# Patient Record
Sex: FEMALE | Race: WHITE | Employment: OTHER
[De-identification: names, ages, dates, MRNs, and addresses within clinical notes are randomized per-mention and may not be internally consistent; named-entity substitution may affect disease eponyms.]

---

## 2017-01-08 LAB — HBA1C MFR BLD: 5.4 %

## 2017-01-11 ENCOUNTER — CARE COORDINATION (OUTPATIENT)
Dept: CARE COORDINATION | Facility: CLINIC | Age: 82
End: 2017-01-11

## 2017-01-19 ENCOUNTER — CARE COORDINATION (OUTPATIENT)
Dept: CARE COORDINATION | Facility: CLINIC | Age: 82
End: 2017-01-19

## 2017-01-25 ENCOUNTER — TELEPHONE (OUTPATIENT)
Dept: FAMILY MEDICINE CLINIC | Facility: CLINIC | Age: 82
End: 2017-01-25

## 2017-01-31 ENCOUNTER — OFFICE VISIT (OUTPATIENT)
Dept: FAMILY MEDICINE CLINIC | Facility: CLINIC | Age: 82
End: 2017-01-31

## 2017-01-31 VITALS
DIASTOLIC BLOOD PRESSURE: 64 MMHG | HEART RATE: 83 BPM | RESPIRATION RATE: 15 BRPM | WEIGHT: 105.9 LBS | SYSTOLIC BLOOD PRESSURE: 136 MMHG | BODY MASS INDEX: 20.34 KG/M2

## 2017-01-31 DIAGNOSIS — D64.9 LOW HEMOGLOBIN: ICD-10-CM

## 2017-01-31 DIAGNOSIS — Z91.81 AT HIGH RISK FOR FALLS: ICD-10-CM
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
## 2017-01-31 DIAGNOSIS — S72.002A CLOSED LEFT HIP FRACTURE, INITIAL ENCOUNTER (HCC): ICD-10-CM

## 2017-01-31 PROCEDURE — 99214 OFFICE O/P EST MOD 30 MIN: CPT | Performed by: NURSE PRACTITIONER

## 2017-01-31 RX ORDER — IBUPROFEN 200 MG
200 TABLET ORAL 2 TIMES DAILY
COMMUNITY
End: 2021-05-10

## 2017-02-05 PROBLEM — S72.002A CLOSED LEFT HIP FRACTURE (HCC): Status: ACTIVE | Noted: 2017-02-05

## 2017-02-05 PROBLEM — Z91.81 AT HIGH RISK FOR FALLS: Status: ACTIVE | Noted: 2017-02-05

## 2017-02-05 ASSESSMENT — ENCOUNTER SYMPTOMS
DIARRHEA: 0
ABDOMINAL PAIN: 0
ABDOMINAL DISTENTION: 0
SHORTNESS OF BREATH: 0
CHEST TIGHTNESS: 0
COLOR CHANGE: 0
NAUSEA: 0

## 2017-02-07 ENCOUNTER — CARE COORDINATION (OUTPATIENT)
Dept: CARE COORDINATION | Facility: CLINIC | Age: 82
End: 2017-02-07

## 2017-03-07 ENCOUNTER — CARE COORDINATION (OUTPATIENT)
Dept: CARE COORDINATION | Facility: CLINIC | Age: 82
End: 2017-03-07

## 2017-04-11 ENCOUNTER — CARE COORDINATION (OUTPATIENT)
Dept: CARE COORDINATION | Age: 82
End: 2017-04-11

## 2017-05-02 ENCOUNTER — CARE COORDINATION (OUTPATIENT)
Dept: CARE COORDINATION | Age: 82
End: 2017-05-02

## 2017-06-13 ENCOUNTER — CARE COORDINATION (OUTPATIENT)
Dept: CARE COORDINATION | Age: 82
End: 2017-06-13

## 2017-06-14 ENCOUNTER — CARE COORDINATION (OUTPATIENT)
Dept: CARE COORDINATION | Age: 82
End: 2017-06-14

## 2017-06-15 ENCOUNTER — CARE COORDINATION (OUTPATIENT)
Dept: CARE COORDINATION | Age: 82
End: 2017-06-15

## 2017-10-05 ENCOUNTER — OFFICE VISIT (OUTPATIENT)
Dept: FAMILY MEDICINE CLINIC | Age: 82
End: 2017-10-05
Payer: MEDICARE

## 2017-10-05 VITALS
HEIGHT: 61 IN | DIASTOLIC BLOOD PRESSURE: 80 MMHG | SYSTOLIC BLOOD PRESSURE: 122 MMHG | TEMPERATURE: 97 F | WEIGHT: 102 LBS | BODY MASS INDEX: 19.26 KG/M2 | HEART RATE: 80 BPM | RESPIRATION RATE: 20 BRPM

## 2017-10-05 DIAGNOSIS — Z23 NEED FOR INFLUENZA VACCINATION: ICD-10-CM

## 2017-10-05 DIAGNOSIS — Z00.00 ROUTINE GENERAL MEDICAL EXAMINATION AT A HEALTH CARE FACILITY: Primary | ICD-10-CM

## 2017-10-05 DIAGNOSIS — H35.30 MACULAR DEGENERATION: ICD-10-CM

## 2017-10-05 PROCEDURE — G0008 ADMIN INFLUENZA VIRUS VAC: HCPCS | Performed by: NURSE PRACTITIONER

## 2017-10-05 PROCEDURE — G0438 PPPS, INITIAL VISIT: HCPCS | Performed by: NURSE PRACTITIONER

## 2017-10-05 PROCEDURE — 90688 IIV4 VACCINE SPLT 0.5 ML IM: CPT | Performed by: NURSE PRACTITIONER

## 2017-10-05 ASSESSMENT — PATIENT HEALTH QUESTIONNAIRE - PHQ9: SUM OF ALL RESPONSES TO PHQ QUESTIONS 1-9: 0

## 2017-10-05 ASSESSMENT — ANXIETY QUESTIONNAIRES: GAD7 TOTAL SCORE: 0

## 2017-10-05 NOTE — PROGRESS NOTES
Medicare Annual Wellness Visit  Name: Juan Herrera Date: 10/5/2017   MRN: [id number] Sex: Female   Age: 80 y.o. Ethnicity: Non-/Non    : 1929 Race: Eloy Ding is here for [facility]    Screenings for behavioral, psychosocial and functional/safety risks, and cognitive dysfunction are all negative except as indicated below. These results, as well as other patient data from the [address] form, are documented in Flowsheets linked to this Encounter. Allergies   Allergen Reactions    Seasonal Other (See Comments)     Prior to Visit Medications    Medication Sig Taking? Authorizing Provider   Multiple Vitamins-Minerals (PRESERVISION AREDS 2 PO) Take 1 capsule by mouth Yes Historical Provider, MD   Ascorbic Acid (VITAMIN C PO)  Yes Historical Provider, MD   ibuprofen (ADVIL;MOTRIN) 200 MG tablet Take 200 mg by mouth 2 times daily Yes Historical Provider, MD   BIOTIN PO Take 1 tablet by mouth Yes Historical Provider, MD   Multiple Vitamins-Minerals (THERAPEUTIC MULTIVITAMIN-MINERALS) tablet Take 1 tablet by mouth daily Yes Historical Provider, MD   CALCIUM PO Take 1 tablet by mouth daily Yes Historical Provider, MD   Multiple Vitamins-Minerals (HAIR/SKIN/NAILS PO) Take 1 tablet by mouth daily Yes Historical Provider, MD   Omega-3 Fatty Acids (FISH OIL PO) Take 1 tablet by mouth daily Yes Historical Provider, MD   aspirin 81 MG tablet Take 81 mg by mouth daily  Historical Provider, MD   cephALEXin (KEFLEX) 500 MG capsule Take 500 mg by mouth Indications: to take before oral surgery   Historical Provider, MD     No past medical history on file.   Past Surgical History:   Procedure Laterality Date    TOTAL HIP ARTHROPLASTY Left      Family History   Problem Relation Age of Onset    Diabetes Other        CareTeam (Including outside providers/suppliers regularly involved in providing care):   Patient Care Team:  Shell Lo CNP as PCP - General (Nurse Practitioner)  Lanell Halsted, CNP as PCP - [facility] Attributed Provider    Wt Readings from Last 3 Encounters:   10/05/17 102 lb (46.3 kg)   01/31/17 105 lb 14.4 oz (48 kg)   09/15/16 101 lb 8 oz (46 kg)     Vitals:    10/05/17 1325   BP: 122/80   Site: Left Arm   Pulse: 80   Resp: 20   Temp: 97 °F (36.1 °C)   TempSrc: Tympanic   Weight: 102 lb (46.3 kg)   Height: 5' 0.5\" (1.537 m)       General Appearance: alert and oriented to person, place and time, well developed and well- nourished, in no acute distress  Skin: warm and dry, no rash or erythema  Head: normocephalic and atraumatic  Eyes: pupils equal, round, and reactive to light, extraocular eye movements intact, conjunctivae normal  ENT: tympanic membrane, external ear and ear canal normal bilaterally, nose without deformity, nasal mucosa and turbinates normal without polyps  Neck: supple and non-tender without mass, no thyromegaly or thyroid nodules, no cervical lymphadenopathy  Pulmonary/Chest: clear to auscultation bilaterally- no wheezes, rales or rhonchi, normal air movement, no respiratory distress  Cardiovascular: normal rate, regular rhythm, normal S1 and S2, no murmurs, rubs, clicks, or gallops, distal pulses intact, no carotid bruits  Abdomen: soft, non-tender, non-distended, normal bowel sounds, no masses or organomegaly  Extremities: no cyanosis, clubbing or edema  Musculoskeletal: normal range of motion, no joint swelling, deformity or tenderness  Neurologic: reflexes normal and symmetric, no cranial nerve deficit, gait, coordination and speech normal    The following problems were reviewed today and where indicated follow up appointments were made and/or referrals ordered.     Risk Factor Screenings with Interventions:   Fall Risk:  2 or more falls in past year?: no  Fall with injury in past year?: (!) yes  Fall Risk Interventions:    · Home safety tips provided    Depression:  PHQ-2 Score: 0  Depression Interventions:  · None indicated    Anxiety:  Anxiety Score: 0  Anxiety Interventions:  · None indicated    Cognitive: Words recalled: 3  Clock Drawing Test (CDT) Score: Normal  Cognitive Impairment Interventions:  · None indicated    Substance Abuse:  Social History     Social History Main Topics    Smoking status: Former Smoker     Types: Cigarettes    Smokeless tobacco: Not on file    Alcohol use 2.4 oz/week     4 Standard drinks or equivalent per week    Drug use: Not on file    Sexual activity: Not on file     Audit Questionnaire: Screen for Alcohol Misuse  How often do you have a drink containing alcohol? :  (twice a day )  Substance Abuse Interventions:  · None indicated    Health Risk Assessment:   General  In general, how would you say your health is?: Excellent  In the past 7 days, have you experienced any of the following?: None of These  Do you get the social and emotional support that you need?: Yes  Do you have a Living Will?: Yes  General Health Risk Interventions:  · Ibuprofen for pain a few times a day. Taking with food. Health Habits/Nutrition  Do you exercise for at least 20 minutes 2-3 times per week?: (!) No  Have you lost any weight without trying in the past 3 months?: No  Do you eat fewer than 2 meals per day?: No  Have you seen a dentist within the past year?: Yes  Body mass index is 19.59 kg/(m^2).   Health Habits/Nutrition Interventions:  · None indicated    Hearing/Vision  Do you or your family notice any trouble with your hearing?: (!) Yes  Do you have difficulty driving, watching TV, or doing any of your daily activities because of your eyesight?: (!) Yes  Have you had an eye exam within the past year?: Yes  Hearing/Vision Interventions:  · Hearing concerns:  patient declines any further evaluation/treatment for hearing issues    Safety  Do you have working smoke detectors?: Yes  Have all throw rugs been removed or fastened?: Yes  Do you have non-slip mats in all bathtubs?: (!) No  Do all of your stairways have a railing or banister?: Yes  Are your doorways, halls and stairs free of clutter?: Yes  Do you always fasten your seatbelt when you are in a car?: Yes  Safety Interventions:  · Home safety tips provided    ADLs  In the past 7 days, did you need help from others to perform any of the following everyday activities?: None  In the past 7 days, did you need help from others to take care of any of the following?: None  ADL Interventions:  · None indicated    Personalized Preventive Plan   Current Health Maintenance Status  Immunization History   Administered Date(s) Administered    Influenza Vaccine, unspecified formulation 10/01/2015    Influenza, Quadv, 3 Years and older, IM 09/15/2016    Pneumococcal 13-valent Conjugate ([id number]) 09/15/2016    Pneumococcal Polysaccharide ([id number]) 08/09/2013    Tdap (Boostrix, Adacel) 10/27/2008        Health Maintenance   Topic Date Due    Zostavax vaccine  02/05/1989    Flu vaccine (1) 09/01/2017    DTaP/Tdap/Td vaccine (2 - Td) 10/27/2018    Pneumococcal low/med risk  Completed     Recommendations for Preventive Services Due: see orders.   Recommended screening schedule for the next 5-10 years is provided to the patient in written form: see Patient Instructions/AVS.

## 2017-10-05 NOTE — PATIENT INSTRUCTIONS
Personalized Preventive Plan for Shauna Brownlee - 10/5/2017  Medicare offers a range of preventive health benefits. Some of the tests and screenings are paid in full while other may be subject to a deductible, co-insurance, and/or copay. Some of these benefits include a comprehensive review of your medical history including lifestyle, illnesses that may run in your family, and various assessments and screenings as appropriate. After reviewing your medical record and screening and assessments performed today your provider may have ordered immunizations, labs, imaging, and/or referrals for you. A list of these orders (if applicable) as well as your Preventive Care list are included within your After Visit Summary for your review. Other Preventive Recommendations:    · A preventive eye exam performed by an eye specialist is recommended every 1-2 years to screen for glaucoma; cataracts, macular degeneration, and other eye disorders. · A preventive dental visit is recommended every 6 months. · Try to get at least 150 minutes of exercise per week or 10,000 steps per day on a pedometer . · Order or download the FREE \"Exercise & Physical Activity: Your Everyday Guide\" from [employer]. Call 3-597.519.6869 or search [employer] online. · You need 5743-8813 mg of calcium and 2659-9908 IU of vitamin D per day. It is possible to meet your calcium requirement with diet alone, but a vitamin D supplement is usually necessary to meet this goal.  · When exposed to the sun, use a sunscreen that protects against both UVA and UVB radiation with an SPF of 30 or greater. Reapply every 2 to 3 hours or after sweating, drying off with a towel, or swimming. · Always wear a seat belt when traveling in a car. Always wear a helmet when riding a bicycle or motorcycle.

## 2018-08-30 ENCOUNTER — TELEPHONE (OUTPATIENT)
Dept: FAMILY MEDICINE CLINIC | Age: 83
End: 2018-08-30

## 2018-08-30 DIAGNOSIS — M79.605 BILATERAL LEG PAIN: Primary | ICD-10-CM

## 2018-08-30 DIAGNOSIS — Z87.81 HX OF FRACTURE OF LEFT HIP: ICD-10-CM

## 2018-08-30 DIAGNOSIS — Z91.81 HX OF FALL: ICD-10-CM

## 2018-08-30 DIAGNOSIS — M79.604 BILATERAL LEG PAIN: Primary | ICD-10-CM

## 2018-10-10 ENCOUNTER — OFFICE VISIT (OUTPATIENT)
Dept: FAMILY MEDICINE CLINIC | Age: 83
End: 2018-10-10
Payer: MEDICARE

## 2018-10-10 VITALS
SYSTOLIC BLOOD PRESSURE: 116 MMHG | RESPIRATION RATE: 16 BRPM | HEIGHT: 61 IN | BODY MASS INDEX: 19.07 KG/M2 | HEART RATE: 72 BPM | TEMPERATURE: 97.7 F | DIASTOLIC BLOOD PRESSURE: 80 MMHG | WEIGHT: 101 LBS

## 2018-10-10 DIAGNOSIS — Z00.00 ROUTINE GENERAL MEDICAL EXAMINATION AT A HEALTH CARE FACILITY: Primary | ICD-10-CM

## 2018-10-10 DIAGNOSIS — R79.89 ABNORMAL CBC: ICD-10-CM

## 2018-10-10 PROCEDURE — G0439 PPPS, SUBSEQ VISIT: HCPCS | Performed by: NURSE PRACTITIONER

## 2018-10-10 ASSESSMENT — LIFESTYLE VARIABLES
HAVE YOU OR SOMEONE ELSE BEEN INJURED AS A RESULT OF YOUR DRINKING: 0
AUDIT-C TOTAL SCORE: 6
HOW MANY STANDARD DRINKS CONTAINING ALCOHOL DO YOU HAVE ON A TYPICAL DAY: 0
HOW OFTEN DO YOU HAVE SIX OR MORE DRINKS ON ONE OCCASION: 2
HAS A RELATIVE, FRIEND, DOCTOR, OR ANOTHER HEALTH PROFESSIONAL EXPRESSED CONCERN ABOUT YOUR DRINKING OR SUGGESTED YOU CUT DOWN: 0
AUDIT TOTAL SCORE: 6
HOW OFTEN DURING THE LAST YEAR HAVE YOU HAD A FEELING OF GUILT OR REMORSE AFTER DRINKING: 0
HOW OFTEN DO YOU HAVE A DRINK CONTAINING ALCOHOL: 4
HOW OFTEN DURING THE LAST YEAR HAVE YOU FAILED TO DO WHAT WAS NORMALLY EXPECTED FROM YOU BECAUSE OF DRINKING: 0
HOW OFTEN DURING THE LAST YEAR HAVE YOU FOUND THAT YOU WERE NOT ABLE TO STOP DRINKING ONCE YOU HAD STARTED: 0
HOW OFTEN DURING THE LAST YEAR HAVE YOU BEEN UNABLE TO REMEMBER WHAT HAPPENED THE NIGHT BEFORE BECAUSE YOU HAD BEEN DRINKING: 0
HOW OFTEN DURING THE LAST YEAR HAVE YOU NEEDED AN ALCOHOLIC DRINK FIRST THING IN THE MORNING TO GET YOURSELF GOING AFTER A NIGHT OF HEAVY DRINKING: 0

## 2018-10-10 ASSESSMENT — ANXIETY QUESTIONNAIRES: GAD7 TOTAL SCORE: 0

## 2018-10-10 ASSESSMENT — PATIENT HEALTH QUESTIONNAIRE - PHQ9
SUM OF ALL RESPONSES TO PHQ QUESTIONS 1-9: 0
SUM OF ALL RESPONSES TO PHQ QUESTIONS 1-9: 0

## 2018-10-10 NOTE — PATIENT INSTRUCTIONS
ibuprofen 600 mg every 8 hours as needed for pain. Cmppression stocking? Personalized Preventive Plan for Kathy Perez - 10/10/2018  Medicare offers a range of preventive health benefits. Some of the tests and screenings are paid in full while other may be subject to a deductible, co-insurance, and/or copay. Some of these benefits include a comprehensive review of your medical history including lifestyle, illnesses that may run in your family, and various assessments and screenings as appropriate. After reviewing your medical record and screening and assessments performed today your provider may have ordered immunizations, labs, imaging, and/or referrals for you. A list of these orders (if applicable) as well as your Preventive Care list are included within your After Visit Summary for your review. Other Preventive Recommendations:    · A preventive eye exam performed by an eye specialist is recommended every 1-2 years to screen for glaucoma; cataracts, macular degeneration, and other eye disorders. · A preventive dental visit is recommended every 6 months. · Try to get at least 150 minutes of exercise per week or 10,000 steps per day on a pedometer . · Order or download the FREE \"Exercise & Physical Activity: Your Everyday Guide\" from [employer]. Call 7-383.356.8683 or search [employer] online. · You need 5418-7619 mg of calcium and 0349-5787 IU of vitamin D per day. It is possible to meet your calcium requirement with diet alone, but a vitamin D supplement is usually necessary to meet this goal.  · When exposed to the sun, use a sunscreen that protects against both UVA and UVB radiation with an SPF of 30 or greater. Reapply every 2 to 3 hours or after sweating, drying off with a towel, or swimming. · Always wear a seat belt when traveling in a car. Always wear a helmet when riding a bicycle or motorcycle.   Patient Education        Bladder Training: Care Instructions  Your Care Instructions    Bladder training is used to treat urge incontinence and stress incontinence. Urge incontinence means that the need to urinate comes on so fast that you can't get to a toilet in time. Stress incontinence means that you leak urine because of pressure on your bladder. For example, it may happen when you laugh, cough, or lift something heavy. Bladder training can increase how long you can wait before you have to urinate. It can also help your bladder hold more urine. And it can give you better control over the urge to urinate. It is important to remember that bladder training takes a few weeks to a few months to make a difference. You may not see results right away, but don't give up. Follow-up care is a key part of your treatment and safety. Be sure to make and go to all appointments, and call your doctor if you are having problems. It's also a good idea to know your test results and keep a list of the medicines you take. How can you care for yourself at home? Work with your doctor to come up with a bladder training program that is right for you. You may use one or more of the following methods. Delayed urination  In the beginning, try to keep from urinating for 5 minutes after you first feel the need to go. While you wait, take deep, slow breaths to relax. Kegel exercises can also help you delay the need to go to the bathroom. After some practice, when you can easily wait 5 minutes to urinate, try to wait 10 minutes before you urinate. Slowly increase the waiting period until you are able to control when you have to urinate. Scheduled urination  Empty your bladder when you first wake up in the morning. Schedule times throughout the day when you will urinate. Start by going to the bathroom every hour, even if you don't need to go. Slowly increase the time between trips to the bathroom. When you have found a schedule that works well for you, keep doing it.   If you wake up during the night and have to urinate, do it. Apply your schedule to waking hours only. Kegel exercises  These tighten and strengthen pelvic muscles, which can help you control the flow of urine. To do Kegel exercises:  Squeeze the same muscles you would use to stop your urine. Your belly and thighs should not move. Hold the squeeze for 3 seconds, and then relax for 3 seconds. Start with 3 seconds. Then add 1 second each week until you are able to squeeze for 10 seconds. Repeat the exercise 10 to 15 times a session. Do three or more sessions a day. When should you call for help? Watch closely for changes in your health, and be sure to contact your doctor if:    Your incontinence is getting worse.     You do not get better as expected. Where can you learn more? Go to [email or web address] org and sign in to your [employer] account. Enter U279 in the [employer] box to learn more about \"Bladder Training: Care Instructions. \"     If you do not have an account, please click on the \"Sign Up Now\" link. Current as of: May 12, 2017  Content Version: 11.7  © 9979-1708 [employer]. Care instructions adapted under license by [facility] ([facility]). If you have questions about a medical condition or this instruction, always ask your healthcare professional. [address] any warranty or liability for your use of this information. Patient Education        Kegel Exercises: Care Instructions  Your Care Instructions    Kegel exercises strengthen muscles around the bladder. These muscles control the flow of urine. Kegel exercises are sometime called \"pelvic floor\" exercises. They can help prevent urine leakage and keep the pelvic organs in place. A woman who just had a baby might want to try Kegel exercises. They can strengthen pelvic muscles that have been weakened by pregnancy and childbirth.  A man or woman may use Kegel exercises to treat urine

## 2018-10-10 NOTE — PROGRESS NOTES
Medicare Annual Wellness Visit  Name: Ally Cowart Date: 10/10/2018   MRN: [id number] Sex: Female   Age: 80 y.o. Ethnicity: Non-/Non    : 1929 Race: Dorothea Cody is here for [employer]      [facility] [place] [facility] Had physical therapy fo Low back pain. Doing exercises. Feels much better. Has help from daughter[facility] Will get flu shot in [place]. Has cut done on ibuprofen. On keflex from the dentist.  Concerns of a leaky bladder. History of stress incontinence. Would like some information on [place] exercises. Does not want medication. Not passed her vision test and no longer has a 's license. She has friends that drive her and also uses [employer]. Has a lump on her left hand, above the knuckle on her pointer finger that she would like looked at. Screenings for behavioral, psychosocial and functional/safety risks, and cognitive dysfunction are all negative except as indicated below. These results, as well as other patient data from the [address] form, are documented in Flowsheets linked to this Encounter. Allergies   Allergen Reactions    Seasonal Other (See Comments)       Prior to Visit Medications    Medication Sig Taking?  Authorizing Provider   Multiple Vitamins-Minerals (PRESERVISION AREDS 2 PO) Take 1 capsule by mouth Yes Historical Provider, MD   Ascorbic Acid (VITAMIN C PO)  Yes Historical Provider, MD   ibuprofen (ADVIL;MOTRIN) 200 MG tablet Take 200 mg by mouth 2 times daily Yes Historical Provider, MD   BIOTIN PO Take 1 tablet by mouth Yes Historical Provider, MD   aspirin 81 MG tablet Take 81 mg by mouth daily Yes Historical Provider, MD   cephALEXin (KEFLEX) 500 MG capsule Take 500 mg by mouth Indications: to take before oral surgery  Yes Historical Provider, MD   Multiple Vitamins-Minerals (THERAPEUTIC MULTIVITAMIN-MINERALS) tablet Take 1 tablet by mouth daily Yes Historical Provider, MD   CALCIUM PO Take 1 tablet by mouth daily Yes Historical Provider, MD   Multiple Vitamins-Minerals (HAIR/SKIN/NAILS PO) Take 1 tablet by mouth daily Yes Historical Provider, MD   Omega-3 Fatty Acids (FISH OIL PO) Take 1 tablet by mouth daily Yes Historical Provider, MD     No past medical history on file. Past Surgical History:   Procedure Laterality Date    TOTAL HIP ARTHROPLASTY Left        Family History   Problem Relation Age of Onset    Diabetes Other        CareTeam (Including outside providers/suppliers regularly involved in providing care):   Patient Care Team:  ALEIDA Queen CNP as PCP - General (Nurse Practitioner)  ALEIDA Queen CNP as PCP - [facility] Attributed Provider    Wt Readings from Last 3 Encounters:   10/10/18 101 lb (45.8 kg)   10/05/17 102 lb (46.3 kg)   01/31/17 105 lb 14.4 oz (48 kg)     Vitals:    10/10/18 1141   BP: 116/80   Site: Left Upper Arm   Position: Sitting   Cuff Size: Medium Adult   Pulse: 72   Resp: 16   Temp: 97.7 °F (36.5 °C)   TempSrc: Tympanic   Weight: 101 lb (45.8 kg)   Height: 5' 1\" (1.549 m)     Body mass index is 19.08 kg/m².     General Appearance: alert and oriented to person, place and time, well-developed and well-nourished, in no acute distress  Skin: warm and dry, no rash or erythema  Head: normocephalic and atraumatic  Eyes: pupils equal, round, and reactive to light, extraocular eye movements intact, conjunctivae normal  ENT: tympanic membrane, external ear and ear canal normal bilaterally, oropharynx clear and moist with normal mucous membranes and hearing grossly normal bilaterally  Neck: neck supple and non tender without mass, no thyromegaly or thyroid nodules, no cervical lymphadenopathy and decreased ROM   Pulmonary/Chest: wheezing present- diffuse throughout  Cardiovascular: normal rate, normal S1 and S2, no gallops, intact distal pulses and no carotid bruits  Abdomen: soft, non-tender, non-distended, normal bowel sounds, no masses or organomegaly  Extremities: no cyanosis and no clubbing  Musculoskeletal: normal range of motion, no joint swelling, deformity or tenderness and 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 The following problems were reviewed today and where indicated follow up appointments were made and/or referrals ordered. Positive Risk Factor Screenings with Interventions:     General Health:  General  In general, how would you say your health is?: Very Good  In the past 7 days, have you experienced any of the following?: (!) New or Increased Pain  Do you get the social and emotional support that you need?: Yes  Do you have a Living Will?: Yes  General Health Risk Interventions:  · Pain issues: patient declines any further evaluation/treatment for this issue, patient had physical therapy. doing exercises at home. Health Habits/Nutrition:  Health Habits/Nutrition  Do you exercise for at least 20 minutes 2-3 times per week?: (!) No  Have you lost any weight without trying in the past 3 months?: (!) Yes  Do you eat fewer than 2 meals per day?: No  Have you seen a dentist within the past year?: Yes  Body mass index is 19.08 kg/m². Health Habits/Nutrition Interventions:  · routine back stretches.     Hearing/Vision:  Hearing/Vision  Do you or your family notice any trouble with your hearing?: (!) Yes  Do you have difficulty driving, watching TV, or doing any of your daily activities because of your eyesight?: No  Have you had an eye exam within the past year?: Yes  Hearing/Vision Interventions:  · Hearing concerns:  patient declines any further evaluation/treatment for hearing issues    Safety:  Safety  Do you have working smoke detectors?: Yes  Have all throw rugs been removed or fastened?: Yes  Do you have non-slip mats in all bathtubs?: (!) No  Do all of your stairways have a railing or banister?: Yes  Are your doorways, halls and stairs free of clutter?: Yes  Do you always fasten your seatbelt when you are in a car?: Yes  Safety Interventions:  · Home safety tips provided    ADL:  ADLs  In the past 7 days, did you need help from others to perform any of the following everyday activities?: None  In the past 7 days, did you need help from others to take care of any of the following?: (!) Transportation  ADL Interventions:  · Patient declines any further evaluation/treatment for this issue     Failed driving test. Macular degeneration. Personalized Preventive Plan   Current Health Maintenance Status  Immunization History   Administered Date(s) Administered    Influenza Vaccine, unspecified formulation 10/01/2015    Influenza, Quadv, 3 Years and older, IM (Fluzone 3 yrs and older or Afluria 5 yrs and older) 09/15/2016, 10/05/2017    Pneumococcal 13-valent Conjugate ([id number]) 09/15/2016    Pneumococcal Polysaccharide (Rzuzzicld22) 08/09/2013    Tdap (Boostrix, Adacel) 10/27/2008        Health Maintenance   Topic Date Due    Shingles Vaccine (1 of 2 - 2 Dose Series) 02/05/1979    Flu vaccine (1) 09/01/2018    DTaP/Tdap/Td vaccine (2 - Td) 10/27/2018    Pneumococcal low/med risk  Completed     Recommendations for Preventive Services Due: see orders and patient instructions/AVS.  . Recommended screening schedule for the next 5-10 years is provided to the patient in written form: see Patient Instructions/AVS.    Recommend chest x-ray for continuous wheezing and lungs. She denies shortness of breath. She does not want any further evaluation or medications.

## 2018-10-18 ENCOUNTER — HOSPITAL ENCOUNTER (OUTPATIENT)
Age: 83
Setting detail: SPECIMEN
Discharge: HOME | End: 2018-10-18
Payer: MEDICARE

## 2018-10-18 DIAGNOSIS — R79.89 ABNORMAL CBC: ICD-10-CM

## 2018-10-18 DIAGNOSIS — Z00.00 ROUTINE GENERAL MEDICAL EXAMINATION AT A HEALTH CARE FACILITY: ICD-10-CM

## 2018-10-18 LAB
ALBUMIN SERPL-MCNC: 4.6 G/DL (ref 3.5–5.2)
ALBUMIN/GLOBULIN RATIO: 2.2 (ref 1–2.5)
ALP BLD-CCNC: 51 U/L (ref 35–104)
ALT SERPL-CCNC: 16 U/L (ref 5–33)
ANION GAP SERPL CALCULATED.3IONS-SCNC: 16 MMOL/L (ref 9–17)
AST SERPL-CCNC: 29 U/L
BILIRUB SERPL-MCNC: 0.61 MG/DL (ref 0.3–1.2)
BUN BLDV-MCNC: 16 MG/DL (ref 8–23)
BUN/CREAT BLD: ABNORMAL (ref 9–20)
CALCIUM SERPL-MCNC: 9.1 MG/DL (ref 8.6–10.4)
CHLORIDE BLD-SCNC: 96 MMOL/L (ref 98–107)
CHOLESTEROL, FASTING: 194 MG/DL
CHOLESTEROL/HDL RATIO: 1.8
CO2: 29 MMOL/L (ref 20–31)
CREAT SERPL-MCNC: 0.52 MG/DL (ref 0.5–0.9)
GFR AFRICAN AMERICAN: >60 ML/MIN
GFR NON-AFRICAN AMERICAN: >60 ML/MIN
GFR SERPL CREATININE-BSD FRML MDRD: ABNORMAL ML/MIN/{1.73_M2}
GFR SERPL CREATININE-BSD FRML MDRD: ABNORMAL ML/MIN/{1.73_M2}
GLUCOSE FASTING: 81 MG/DL (ref 70–99)
HCT VFR BLD CALC: 41 % (ref 36.3–47.1)
HDLC SERPL-MCNC: 110 MG/DL
HEMOGLOBIN: 13.4 G/DL (ref 11.9–15.1)
LDL CHOLESTEROL: 72 MG/DL (ref 0–130)
MCH RBC QN AUTO: 33.6 PG (ref 25.2–33.5)
MCHC RBC AUTO-ENTMCNC: 32.7 G/DL (ref 28.4–34.8)
MCV RBC AUTO: 102.8 FL (ref 82.6–102.9)
NRBC AUTOMATED: 0 PER 100 WBC
PDW BLD-RTO: 13.1 % (ref 11.8–14.4)
PLATELET # BLD: 313 K/UL (ref 138–453)
PMV BLD AUTO: 10.9 FL (ref 8.1–13.5)
POTASSIUM SERPL-SCNC: 4.7 MMOL/L (ref 3.7–5.3)
RBC # BLD: 3.99 M/UL (ref 3.95–5.11)
SODIUM BLD-SCNC: 141 MMOL/L (ref 135–144)
TOTAL PROTEIN: 6.7 G/DL (ref 6.4–8.3)
TRIGLYCERIDE, FASTING: 58 MG/DL
VLDLC SERPL CALC-MCNC: NORMAL MG/DL (ref 1–30)
WBC # BLD: 6 K/UL (ref 3.5–11.3)

## 2018-10-23 ENCOUNTER — TELEPHONE (OUTPATIENT)
Dept: FAMILY MEDICINE CLINIC | Age: 83
End: 2018-10-23

## 2018-10-23 DIAGNOSIS — S72.002A CLOSED FRACTURE OF LEFT HIP, INITIAL ENCOUNTER (HCC): Primary | ICD-10-CM

## 2018-10-23 DIAGNOSIS — Z91.81 AT HIGH RISK FOR FALLS: ICD-10-CM

## 2019-02-07 ENCOUNTER — OFFICE VISIT (OUTPATIENT)
Dept: FAMILY MEDICINE CLINIC | Age: 84
End: 2019-02-07
Payer: MEDICARE

## 2019-02-07 VITALS — HEIGHT: 61 IN | WEIGHT: 100.97 LBS | BODY MASS INDEX: 19.06 KG/M2 | OXYGEN SATURATION: 99 % | HEART RATE: 70 BPM

## 2019-02-07 DIAGNOSIS — H61.23 IMPACTED CERUMEN OF BOTH EARS: Primary | ICD-10-CM

## 2019-02-07 PROCEDURE — 69210 REMOVE IMPACTED EAR WAX UNI: CPT | Performed by: PHYSICIAN ASSISTANT

## 2019-02-07 PROCEDURE — 99213 OFFICE O/P EST LOW 20 MIN: CPT | Performed by: PHYSICIAN ASSISTANT

## 2019-02-07 ASSESSMENT — PATIENT HEALTH QUESTIONNAIRE - PHQ9
SUM OF ALL RESPONSES TO PHQ QUESTIONS 1-9: 0
SUM OF ALL RESPONSES TO PHQ QUESTIONS 1-9: 0
2. FEELING DOWN, DEPRESSED OR HOPELESS: 0
SUM OF ALL RESPONSES TO PHQ9 QUESTIONS 1 & 2: 0
1. LITTLE INTEREST OR PLEASURE IN DOING THINGS: 0

## 2019-02-10 ASSESSMENT — ENCOUNTER SYMPTOMS
COUGH: 0
SINUS PRESSURE: 0
SINUS PAIN: 0
ABDOMINAL PAIN: 0
VOMITING: 0
WHEEZING: 0
DIARRHEA: 0
CHEST TIGHTNESS: 0
SORE THROAT: 0
RHINORRHEA: 0

## 2019-10-17 ENCOUNTER — HOSPITAL ENCOUNTER (OUTPATIENT)
Age: 84
Setting detail: SPECIMEN
Discharge: HOME | End: 2019-10-17
Payer: MEDICARE

## 2019-10-17 ENCOUNTER — OFFICE VISIT (OUTPATIENT)
Dept: FAMILY MEDICINE CLINIC | Age: 84
End: 2019-10-17
Payer: MEDICARE

## 2019-10-17 VITALS
SYSTOLIC BLOOD PRESSURE: 126 MMHG | BODY MASS INDEX: 19.07 KG/M2 | TEMPERATURE: 97.7 F | OXYGEN SATURATION: 95 % | HEIGHT: 61 IN | DIASTOLIC BLOOD PRESSURE: 70 MMHG | RESPIRATION RATE: 16 BRPM | WEIGHT: 101 LBS | HEART RATE: 72 BPM

## 2019-10-17 DIAGNOSIS — M67.442 GANGLION CYST OF FINGER OF LEFT HAND: ICD-10-CM

## 2019-10-17 DIAGNOSIS — M65.322 TRIGGER INDEX FINGER OF LEFT HAND: ICD-10-CM

## 2019-10-17 DIAGNOSIS — R53.83 FATIGUE, UNSPECIFIED TYPE: ICD-10-CM

## 2019-10-17 DIAGNOSIS — Z00.00 ROUTINE GENERAL MEDICAL EXAMINATION AT A HEALTH CARE FACILITY: Primary | ICD-10-CM

## 2019-10-17 LAB
ALBUMIN SERPL-MCNC: 4.4 G/DL (ref 3.5–5.2)
ALBUMIN/GLOBULIN RATIO: 1.8 (ref 1–2.5)
ALP BLD-CCNC: 45 U/L (ref 35–104)
ALT SERPL-CCNC: 16 U/L (ref 5–33)
ANION GAP SERPL CALCULATED.3IONS-SCNC: 14 MMOL/L (ref 9–17)
AST SERPL-CCNC: 28 U/L
BILIRUB SERPL-MCNC: 0.23 MG/DL (ref 0.3–1.2)
BUN BLDV-MCNC: 30 MG/DL (ref 8–23)
BUN/CREAT BLD: ABNORMAL (ref 9–20)
CALCIUM SERPL-MCNC: 9.4 MG/DL (ref 8.6–10.4)
CHLORIDE BLD-SCNC: 94 MMOL/L (ref 98–107)
CO2: 26 MMOL/L (ref 20–31)
CREAT SERPL-MCNC: 0.67 MG/DL (ref 0.5–0.9)
GFR AFRICAN AMERICAN: >60 ML/MIN
GFR NON-AFRICAN AMERICAN: >60 ML/MIN
GFR SERPL CREATININE-BSD FRML MDRD: ABNORMAL ML/MIN/{1.73_M2}
GFR SERPL CREATININE-BSD FRML MDRD: ABNORMAL ML/MIN/{1.73_M2}
GLUCOSE BLD-MCNC: 108 MG/DL (ref 70–99)
HCT VFR BLD CALC: 38.2 % (ref 36.3–47.1)
HEMOGLOBIN: 12.5 G/DL (ref 11.9–15.1)
MCH RBC QN AUTO: 33 PG (ref 25.2–33.5)
MCHC RBC AUTO-ENTMCNC: 32.7 G/DL (ref 28.4–34.8)
MCV RBC AUTO: 100.8 FL (ref 82.6–102.9)
NRBC AUTOMATED: 0 PER 100 WBC
PDW BLD-RTO: 13.2 % (ref 11.8–14.4)
PLATELET # BLD: 322 K/UL (ref 138–453)
PMV BLD AUTO: 11.1 FL (ref 8.1–13.5)
POTASSIUM SERPL-SCNC: 4.8 MMOL/L (ref 3.7–5.3)
RBC # BLD: 3.79 M/UL (ref 3.95–5.11)
SODIUM BLD-SCNC: 134 MMOL/L (ref 135–144)
TOTAL PROTEIN: 6.9 G/DL (ref 6.4–8.3)
WBC # BLD: 7.2 K/UL (ref 3.5–11.3)

## 2019-10-17 PROCEDURE — G0439 PPPS, SUBSEQ VISIT: HCPCS | Performed by: NURSE PRACTITIONER

## 2019-10-17 ASSESSMENT — PATIENT HEALTH QUESTIONNAIRE - PHQ9
SUM OF ALL RESPONSES TO PHQ QUESTIONS 1-9: 0
SUM OF ALL RESPONSES TO PHQ QUESTIONS 1-9: 0

## 2019-10-17 ASSESSMENT — LIFESTYLE VARIABLES
HOW OFTEN DURING THE LAST YEAR HAVE YOU FAILED TO DO WHAT WAS NORMALLY EXPECTED FROM YOU BECAUSE OF DRINKING: 0
HAS A RELATIVE, FRIEND, DOCTOR, OR ANOTHER HEALTH PROFESSIONAL EXPRESSED CONCERN ABOUT YOUR DRINKING OR SUGGESTED YOU CUT DOWN: 0
HOW MANY STANDARD DRINKS CONTAINING ALCOHOL DO YOU HAVE ON A TYPICAL DAY: 0
HOW OFTEN DURING THE LAST YEAR HAVE YOU FOUND THAT YOU WERE NOT ABLE TO STOP DRINKING ONCE YOU HAD STARTED: 0
HOW OFTEN DURING THE LAST YEAR HAVE YOU BEEN UNABLE TO REMEMBER WHAT HAPPENED THE NIGHT BEFORE BECAUSE YOU HAD BEEN DRINKING: 0
AUDIT TOTAL SCORE: 4
HOW OFTEN DURING THE LAST YEAR HAVE YOU HAD A FEELING OF GUILT OR REMORSE AFTER DRINKING: 0
HOW OFTEN DURING THE LAST YEAR HAVE YOU NEEDED AN ALCOHOLIC DRINK FIRST THING IN THE MORNING TO GET YOURSELF GOING AFTER A NIGHT OF HEAVY DRINKING: 0
HAVE YOU OR SOMEONE ELSE BEEN INJURED AS A RESULT OF YOUR DRINKING: 0
HOW OFTEN DO YOU HAVE A DRINK CONTAINING ALCOHOL: 4
HOW OFTEN DO YOU HAVE SIX OR MORE DRINKS ON ONE OCCASION: 0
AUDIT-C TOTAL SCORE: 4

## 2020-06-16 ENCOUNTER — TELEPHONE (OUTPATIENT)
Dept: FAMILY MEDICINE CLINIC | Age: 85
End: 2020-06-16

## 2020-06-17 ENCOUNTER — TELEPHONE (OUTPATIENT)
Dept: FAMILY MEDICINE CLINIC | Age: 85
End: 2020-06-17

## 2020-07-21 ENCOUNTER — TELEPHONE (OUTPATIENT)
Dept: FAMILY MEDICINE CLINIC | Age: 85
End: 2020-07-21

## 2020-07-24 ENCOUNTER — TELEPHONE (OUTPATIENT)
Dept: FAMILY MEDICINE CLINIC | Facility: CLINIC | Age: 85
End: 2020-07-24

## 2020-10-10 ENCOUNTER — PATIENT MESSAGE (OUTPATIENT)
Dept: FAMILY MEDICINE CLINIC | Age: 85
End: 2020-10-10

## 2020-10-18 ASSESSMENT — PATIENT HEALTH QUESTIONNAIRE - PHQ9
SUM OF ALL RESPONSES TO PHQ QUESTIONS 1-9: 0
2. FEELING DOWN, DEPRESSED OR HOPELESS: 0
SUM OF ALL RESPONSES TO PHQ9 QUESTIONS 1 & 2: 0
SUM OF ALL RESPONSES TO PHQ QUESTIONS 1-9: 0
SUM OF ALL RESPONSES TO PHQ QUESTIONS 1-9: 0
1. LITTLE INTEREST OR PLEASURE IN DOING THINGS: 0

## 2020-10-18 ASSESSMENT — LIFESTYLE VARIABLES
HOW OFTEN DO YOU HAVE SIX OR MORE DRINKS ON ONE OCCASION: MONTHLY
HOW OFTEN DURING THE LAST YEAR HAVE YOU BEEN UNABLE TO REMEMBER WHAT HAPPENED THE NIGHT BEFORE BECAUSE YOU HAD BEEN DRINKING: 0
AUDIT TOTAL SCORE: 0
HOW OFTEN DO YOU HAVE A DRINK CONTAINING ALCOHOL: 4
HOW MANY STANDARD DRINKS CONTAINING ALCOHOL DO YOU HAVE ON A TYPICAL DAY: THREE OR FOUR
HOW OFTEN DURING THE LAST YEAR HAVE YOU HAD A FEELING OF GUILT OR REMORSE AFTER DRINKING: 0
HOW OFTEN DURING THE LAST YEAR HAVE YOU HAD A FEELING OF GUILT OR REMORSE AFTER DRINKING: NEVER
AUDIT TOTAL SCORE: 7
HOW MANY STANDARD DRINKS CONTAINING ALCOHOL DO YOU HAVE ON A TYPICAL DAY: 1
HOW OFTEN DURING THE LAST YEAR HAVE YOU FAILED TO DO WHAT WAS NORMALLY EXPECTED FROM YOU BECAUSE OF DRINKING: NEVER
HAS A RELATIVE, FRIEND, DOCTOR, OR ANOTHER HEALTH PROFESSIONAL EXPRESSED CONCERN ABOUT YOUR DRINKING OR SUGGESTED YOU CUT DOWN: NO
HOW OFTEN DO YOU HAVE SIX OR MORE DRINKS ON ONE OCCASION: 2
AUDIT-C TOTAL SCORE: 0
HOW OFTEN DURING THE LAST YEAR HAVE YOU FAILED TO DO WHAT WAS NORMALLY EXPECTED FROM YOU BECAUSE OF DRINKING: 0
HAVE YOU OR SOMEONE ELSE BEEN INJURED AS A RESULT OF YOUR DRINKING: 0
HAS A RELATIVE, FRIEND, DOCTOR, OR ANOTHER HEALTH PROFESSIONAL EXPRESSED CONCERN ABOUT YOUR DRINKING OR SUGGESTED YOU CUT DOWN: 0
HOW OFTEN DURING THE LAST YEAR HAVE YOU NEEDED AN ALCOHOLIC DRINK FIRST THING IN THE MORNING TO GET YOURSELF GOING AFTER A NIGHT OF HEAVY DRINKING: NEVER
HOW OFTEN DURING THE LAST YEAR HAVE YOU BEEN UNABLE TO REMEMBER WHAT HAPPENED THE NIGHT BEFORE BECAUSE YOU HAD BEEN DRINKING: NEVER
HOW OFTEN DURING THE LAST YEAR HAVE YOU NEEDED AN ALCOHOLIC DRINK FIRST THING IN THE MORNING TO GET YOURSELF GOING AFTER A NIGHT OF HEAVY DRINKING: 0
HOW OFTEN DO YOU HAVE A DRINK CONTAINING ALCOHOL: FOUR OR MORE TIMES A WEEK
HOW OFTEN DURING THE LAST YEAR HAVE YOU FOUND THAT YOU WERE NOT ABLE TO STOP DRINKING ONCE YOU HAD STARTED: 0
AUDIT-C TOTAL SCORE: 7
HOW OFTEN DURING THE LAST YEAR HAVE YOU FOUND THAT YOU WERE NOT ABLE TO STOP DRINKING ONCE YOU HAD STARTED: NEVER
HAVE YOU OR SOMEONE ELSE BEEN INJURED AS A RESULT OF YOUR DRINKING: NO

## 2020-10-22 ENCOUNTER — HOSPITAL ENCOUNTER (OUTPATIENT)
Age: 85
Setting detail: SPECIMEN
Discharge: HOME | End: 2020-10-22
Payer: MEDICARE

## 2020-10-22 ENCOUNTER — OFFICE VISIT (OUTPATIENT)
Dept: FAMILY MEDICINE CLINIC | Age: 85
End: 2020-10-22
Payer: MEDICARE

## 2020-10-22 ENCOUNTER — TELEPHONE (OUTPATIENT)
Dept: FAMILY MEDICINE CLINIC | Age: 85
End: 2020-10-22

## 2020-10-22 VITALS
HEIGHT: 60 IN | BODY MASS INDEX: 19.16 KG/M2 | SYSTOLIC BLOOD PRESSURE: 122 MMHG | OXYGEN SATURATION: 94 % | HEART RATE: 94 BPM | TEMPERATURE: 97.3 F | DIASTOLIC BLOOD PRESSURE: 80 MMHG | WEIGHT: 97.6 LBS

## 2020-10-22 LAB
ABSOLUTE EOS #: 0.12 K/UL (ref 0–0.44)
ABSOLUTE IMMATURE GRANULOCYTE: <0.03 K/UL (ref 0–0.3)
ABSOLUTE LYMPH #: 1.39 K/UL (ref 1.1–3.7)
ABSOLUTE MONO #: 1.02 K/UL (ref 0.1–1.2)
ALBUMIN SERPL-MCNC: 4.3 G/DL (ref 3.5–5.2)
ALBUMIN/GLOBULIN RATIO: 1.8 (ref 1–2.5)
ALP BLD-CCNC: 47 U/L (ref 35–104)
ALT SERPL-CCNC: 13 U/L (ref 5–33)
ANION GAP SERPL CALCULATED.3IONS-SCNC: 14 MMOL/L (ref 9–17)
AST SERPL-CCNC: 27 U/L
BASOPHILS # BLD: 1 % (ref 0–2)
BASOPHILS ABSOLUTE: 0.06 K/UL (ref 0–0.2)
BILIRUB SERPL-MCNC: 0.34 MG/DL (ref 0.3–1.2)
BUN BLDV-MCNC: 25 MG/DL (ref 8–23)
BUN/CREAT BLD: ABNORMAL (ref 9–20)
CALCIUM SERPL-MCNC: 9.4 MG/DL (ref 8.6–10.4)
CHLORIDE BLD-SCNC: 96 MMOL/L (ref 98–107)
CO2: 25 MMOL/L (ref 20–31)
CREAT SERPL-MCNC: 0.64 MG/DL (ref 0.5–0.9)
DIFFERENTIAL TYPE: ABNORMAL
EOSINOPHILS RELATIVE PERCENT: 2 % (ref 1–4)
GFR AFRICAN AMERICAN: >60 ML/MIN
GFR NON-AFRICAN AMERICAN: >60 ML/MIN
GFR SERPL CREATININE-BSD FRML MDRD: ABNORMAL ML/MIN/{1.73_M2}
GFR SERPL CREATININE-BSD FRML MDRD: ABNORMAL ML/MIN/{1.73_M2}
GLUCOSE FASTING: 102 MG/DL (ref 70–99)
HCT VFR BLD CALC: 39.3 % (ref 36.3–47.1)
HEMOGLOBIN: 12.5 G/DL (ref 11.9–15.1)
IMMATURE GRANULOCYTES: 0 %
LYMPHOCYTES # BLD: 20 % (ref 24–43)
MCH RBC QN AUTO: 32.1 PG (ref 25.2–33.5)
MCHC RBC AUTO-ENTMCNC: 31.8 G/DL (ref 28.4–34.8)
MCV RBC AUTO: 100.8 FL (ref 82.6–102.9)
MONOCYTES # BLD: 15 % (ref 3–12)
NRBC AUTOMATED: 0 PER 100 WBC
PDW BLD-RTO: 13.2 % (ref 11.8–14.4)
PLATELET # BLD: 332 K/UL (ref 138–453)
PLATELET ESTIMATE: ABNORMAL
PMV BLD AUTO: 11.3 FL (ref 8.1–13.5)
POTASSIUM SERPL-SCNC: 5 MMOL/L (ref 3.7–5.3)
RBC # BLD: 3.9 M/UL (ref 3.95–5.11)
RBC # BLD: ABNORMAL 10*6/UL
SEG NEUTROPHILS: 62 % (ref 36–65)
SEGMENTED NEUTROPHILS ABSOLUTE COUNT: 4.3 K/UL (ref 1.5–8.1)
SODIUM BLD-SCNC: 135 MMOL/L (ref 135–144)
TOTAL PROTEIN: 6.7 G/DL (ref 6.4–8.3)
WBC # BLD: 6.9 K/UL (ref 3.5–11.3)
WBC # BLD: ABNORMAL 10*3/UL

## 2020-10-22 PROCEDURE — G0439 PPPS, SUBSEQ VISIT: HCPCS | Performed by: STUDENT IN AN ORGANIZED HEALTH CARE EDUCATION/TRAINING PROGRAM

## 2020-10-22 RX ORDER — ACETAMINOPHEN 500 MG
500 TABLET ORAL EVERY 6 HOURS PRN
Qty: 120 TABLET | Refills: 3 | Status: SHIPPED | OUTPATIENT
Start: 2020-10-22 | End: 2021-02-02

## 2020-10-22 RX ORDER — NAPROXEN 375 MG/1
375 TABLET ORAL 2 TIMES DAILY PRN
Qty: 60 TABLET | Refills: 5 | Status: SHIPPED | OUTPATIENT
Start: 2020-10-22 | End: 2021-02-02

## 2020-10-22 RX ORDER — ZINC SULFATE 50(220)MG
220 CAPSULE ORAL DAILY
Qty: 30 CAPSULE | Refills: 3 | Status: SHIPPED | OUTPATIENT
Start: 2020-10-22 | End: 2021-04-12

## 2020-10-22 RX ORDER — LATANOPROST 50 UG/ML
SOLUTION/ DROPS OPHTHALMIC
COMMUNITY
Start: 2020-10-08 | End: 2021-04-12

## 2020-10-22 SDOH — ECONOMIC STABILITY: FOOD INSECURITY: WITHIN THE PAST 12 MONTHS, THE FOOD YOU BOUGHT JUST DIDN'T LAST AND YOU DIDN'T HAVE MONEY TO GET MORE.: NEVER TRUE

## 2020-10-22 SDOH — ECONOMIC STABILITY: TRANSPORTATION INSECURITY
IN THE PAST 12 MONTHS, HAS LACK OF TRANSPORTATION KEPT YOU FROM MEETINGS, WORK, OR FROM GETTING THINGS NEEDED FOR DAILY LIVING?: NO

## 2020-10-22 SDOH — ECONOMIC STABILITY: FOOD INSECURITY: WITHIN THE PAST 12 MONTHS, YOU WORRIED THAT YOUR FOOD WOULD RUN OUT BEFORE YOU GOT MONEY TO BUY MORE.: NEVER TRUE

## 2020-10-22 SDOH — ECONOMIC STABILITY: INCOME INSECURITY: HOW HARD IS IT FOR YOU TO PAY FOR THE VERY BASICS LIKE FOOD, HOUSING, MEDICAL CARE, AND HEATING?: NOT HARD AT ALL

## 2020-10-22 SDOH — ECONOMIC STABILITY: TRANSPORTATION INSECURITY
IN THE PAST 12 MONTHS, HAS THE LACK OF TRANSPORTATION KEPT YOU FROM MEDICAL APPOINTMENTS OR FROM GETTING MEDICATIONS?: NO

## 2020-10-22 ASSESSMENT — PATIENT HEALTH QUESTIONNAIRE - PHQ9
SUM OF ALL RESPONSES TO PHQ QUESTIONS 1-9: 0
SUM OF ALL RESPONSES TO PHQ9 QUESTIONS 1 & 2: 0
1. LITTLE INTEREST OR PLEASURE IN DOING THINGS: 0
2. FEELING DOWN, DEPRESSED OR HOPELESS: 0

## 2020-10-22 NOTE — TELEPHONE ENCOUNTER
Pt's daughter Cristy(No HIPAA Consent)is requesting return call regarding pt's visit today(no information was given).  Her telephone number is [phone]

## 2020-10-22 NOTE — PROGRESS NOTES
Medicare Annual Wellness Visit  Name: Kristine Grace Date: 10/22/2020   MRN: [id number] Sex: Female   Age: 80 y.o. Ethnicity: Non-/Non    : 1929 Race: Evon Morrison is here for Sammie Weldonconner [facility]    Screenings for behavioral, psychosocial and functional/safety risks, and cognitive dysfunction are all negative except as indicated below. These results, as well as other patient data from the [address] form, are documented in Flowsheets linked to this Encounter. Allergies   Allergen Reactions    Seasonal Other (See Comments)       Prior to Visit Medications    Medication Sig Taking? Authorizing Provider   Ascorbic Acid (VITAMIN C PO)  Yes Historical Provider, MD   ibuprofen (ADVIL;MOTRIN) 200 MG tablet Take 200 mg by mouth 2 times daily Yes Historical Provider, MD   BIOTIN PO Take 1 tablet by mouth Yes Historical Provider, MD   aspirin 81 MG tablet Take 81 mg by mouth daily Yes Historical Provider, MD   Multiple Vitamins-Minerals (THERAPEUTIC MULTIVITAMIN-MINERALS) tablet Take 1 tablet by mouth daily Yes Historical Provider, MD   CALCIUM PO Take 1 tablet by mouth daily Yes Historical Provider, MD   Multiple Vitamins-Minerals (HAIR/SKIN/NAILS PO) Take 1 tablet by mouth daily Yes Historical Provider, MD   Omega-3 Fatty Acids (FISH OIL PO) Take 1 tablet by mouth daily Yes Historical Provider, MD   latanoprost (XALATAN) 0.005 % ophthalmic solution   Historical Provider, MD       No past medical history on file.     Past Surgical History:   Procedure Laterality Date    TOTAL HIP ARTHROPLASTY Left        Family History   Problem Relation Age of Onset    Diabetes Other        CareTeam (Including outside providers/suppliers regularly involved in providing care):   Patient Care Team:  ALEIDA Neff CNP as PCP - General (Nurse Practitioner)  ALEIDA Neff CNP as PCP - REHABILITATION [facility] Empaneled Provider    Wt Readings from Last 3 Encounters:   10/22/20 97 lb 9.6 oz (44.3 kg)   10/17/19 101 lb (45.8 kg)   02/07/19 100 lb 15.5 oz (45.8 kg)     Vitals:    10/22/20 1135   BP: 122/80   Site: Left Upper Arm   Position: Sitting   Cuff Size: Small Adult   Pulse: 94   Temp: 97.3 °F (36.3 °C)   TempSrc: Temporal   SpO2: 94%   Weight: 97 lb 9.6 oz (44.3 kg)   Height: 5' (1.524 m)     Body mass index is 19.06 kg/m². Based upon direct observation of the patient, evaluation of cognition reveals recent memory intact, remote memory impaired. Patient's complete Health Risk Assessment and screening values have been reviewed and are found in Flowsheets. The following problems were reviewed today and where indicated follow up appointments were made and/or referrals ordered. Positive Risk Factor Screenings with Interventions:     General Health and ACP:  General  In general, how would you say your health is?: Good  In the past 7 days, have you experienced any of the following?  New or Increased Pain, New or Increased Fatigue, Loneliness, Social Isolation, Stress or Anger?: None of These  Do you get the social and emotional support that you need?: Yes  Do you have a Living Will?: Yes  Advance Directives     Power of  Living Will ACP-Advance Directive ACP-Power of     Not on File Not on File Filed [address] [facility] Risk Interventions:  · Poor self-assessment of health status: patient advised to follow-up in this office for further evaluation and treatment of none within 1 day(s), referred to [facility] A[facility]    Hearing/Vision:  No exam data present  Hearing/Vision  Do you or your family notice any trouble with your hearing?: No  Do you have difficulty driving, watching TV, or doing any of your daily activities because of your eyesight?: (!) Yes  Have you had an eye exam within the past year?: Yes  Hearing/Vision Interventions:  · none    Personalized Preventive Plan   Current Health Maintenance Status  Immunization History   Administered Date(s) Administered    Influenza Vaccine, unspecified formulation 10/01/2015    Influenza Virus Vaccine 09/12/2020    Influenza, Lois Broody, IM, (6 mo and older Fluzone, Flulaval, Fluarix and 3 yrs and older Afluria) 09/15/2016, 10/05/2017    Influenza, Triv, inactivated, subunit, adjuvanted, IM (Fluad 65 yrs and older) 10/10/2018, 10/03/2019    Pneumococcal Conjugate 13-valent ([id number]) 09/15/2016    Pneumococcal Polysaccharide ([id number]) 08/09/2013    Tdap (Boostrix, Adacel) 10/27/2008    Zoster Recombinant (Shingrix) 10/22/2019, 02/06/2020        Health Maintenance   Topic Date Due    DTaP/Tdap/Td vaccine (2 - Td) 10/27/2018    Annual Wellness Visit (AWV)  05/29/2019    Flu vaccine  Completed    Shingles Vaccine  Completed    Pneumococcal 65+ years Vaccine  Completed    Hepatitis A vaccine  Aged Out    Hepatitis B vaccine  Aged Out    Hib vaccine  Aged Out    Meningococcal (ACWY) vaccine  Aged Out     Recommendations for Sanrad Due: see orders and patient instructions/AVS.  . Recommended screening schedule for the next 5-10 years is provided to the patient in written form: see Patient Bobby Sullivan was seen today for medicare awv. Diagnoses and all orders for this visit:    Routine general medical examination at a health care facility          Had concerned about earwax nonobstructive right side worse than left if it becomes a persistent issue or if hearing aid folks insist to get it cleaned I would recommend going to see an ENT doctor.

## 2020-10-22 NOTE — PATIENT INSTRUCTIONS
Advance Directives: Care Instructions  Overview  An advance directive is a legal way to state your wishes at the end of your life. It tells your family and your doctor what to do if you can't say what you want. There are two main types of advance directives. You can change them any time your wishes change. Living will. This form tells your family and your doctor your wishes about life support and other treatment. The form is also called a declaration. Medical power of . This form lets you name a person to make treatment decisions for you when you can't speak for yourself. This person is called a health care agent (health care proxy, health care surrogate). The form is also called a durable power of  for health care. If you do not have an advance directive, decisions about your medical care may be made by a family member, or by a doctor or a  who doesn't know you. It may help to think of an advance directive as a gift to the people who care for you. If you have one, they won't have to make tough decisions by themselves. Follow-up care is a key part of your treatment and safety. Be sure to make and go to all appointments, and call your doctor if you are having problems. It's also a good idea to know your test results and keep a list of the medicines you take. What should you include in an advance directive? Many states have a unique advance directive form. (It may ask you to address specific issues.) Or you might use a universal form that's approved by many states. If your form doesn't tell you what to address, it may be hard to know what to include in your advance directive. Use the questions below to help you get started. · Who do you want to make decisions about your medical care if you are not able to? · What life-support measures do you want if you have a serious illness that gets worse over time or can't be cured? · What are you most afraid of that might happen? (Maybe you're afraid of having pain, losing your independence, or being kept alive by machines.)  · Where would you prefer to die? (Your home? A hospital? A nursing home?)  · Do you want to donate your organs when you die? · Do you want certain Sikhism practices performed before you die? When should you call for help? Be sure to contact your doctor if you have any questions. Where can you learn more? Go to [email or web address] org and sign in to your MovieLaLa account. Enter R264 in the [employer] box to learn more about \"Advance Directives: Care Instructions. \"     If you do not have an account, please click on the \"Sign Up Now\" link. Current as of: December 9, 2019               Content Version: 12.6  © 6564-2200 [employer], [employer]. Care instructions adapted under license by [facility] ([facility]). If you have questions about a medical condition or this instruction, always ask your healthcare professional. [place] [address] any warranty or liability for your use of this information. Learning About Medical Power of   What is a medical power of ? A medical power of , also called a durable power of  for health care, is one type of the legal forms called advance directives. It lets you name the person you want to make treatment decisions for you if you can't speak or decide for yourself. The person you choose is called your health care agent. This person is also called a health care proxy or health care surrogate. A medical power of  may be called something else in your state. How do you choose a health care agent? Choose your health care agent carefully. This person may or may not be a family member. Talk to the person before you make your final decision. Make sure he or she is comfortable with this responsibility. It's a good idea to choose someone who:  · Is at least 25years old.   · Knows you well and understands what makes life meaningful for you. · Understands your Sikhism and moral values. · Will do what you want, not what he or she wants. · Will be able to make difficult choices at a stressful time. · Will be able to refuse or stop treatment, if that is what you would want, even if you could die. · Will be firm and confident with health professionals if needed. · Will ask questions to get needed information. · Lives near you or agrees to travel to you if needed. Your family may help you make medical decisions while you can still be part of that process. But it's important to choose one person to be your health care agent in case you aren't able to make decisions for yourself. If you don't fill out the legal form and name a health care agent, the decisions your family can make may be limited. A health care agent may be called something else in your state. Who will make decisions for you if you don't have a health care agent? If you don't have a health care agent or a living will, you may not get the care you want. Decisions may be made by family members who disagree about your medical care. Or decisions may be made by a medical professional who doesn't know you well. In some cases, a  makes the decisions. When you name a health care agent, it is very clear who has the power to make health decisions for you. How do you name a health care agent? You name your health care agent on a legal form. This form is usually called a medical power of . Ask your hospital, state bar association, or office on aging where to find these forms. You must sign the form to make it legal. Some states require you to get the form notarized. This means that a person called a  watches you sign the form and then he or she signs the form. Some states also require that two or more witnesses sign the form. Be sure to tell your family members and doctors who your health care agent is.   Where can you learn more? Go to https://chpepiceweb.[email or web address] org and sign in to your Tictail account. Enter [id number] in the [facility] box to learn more about \"Learning About [address]. \"     If you do not have an account, please click on the \"Sign Up Now\" link. Current as of: December 9, 2019               Content Version: 12.6  © 6473-4883 Casero. Care instructions adapted under license by [facility] ([facility]). If you have questions about a medical condition or this instruction, always ask your healthcare professional. [address] any warranty or liability for your use of this information. Learning About Living Perroy  What is a living will? A living will, also called a declaration, is a legal form. It tells your family and your doctor your wishes when you can't speak for yourself. It's used by the health professionals who will treat you as you near the end of your life or if you get seriously hurt or ill. If you put your wishes in writing, your loved ones and others will know what kind of care you want. They won't need to guess. This can ease your mind and be helpful to others. And you can change or cancel your living will at any time. A living will is not the same as an estate or property will. An estate will explains what you want to happen with your money and property after you die. How do you use it? A living will is used to describe the kinds of treatment or life support you want as you near the end of your life or if you get seriously hurt or ill. Keep these facts in mind about living gomez. · Your living will is used only if you can't speak or make decisions for yourself. Most often, one or more doctors must certify that you can't speak or decide for yourself before your living will takes effect. · If you get better and can speak for yourself again, you can accept or refuse any treatment.  It doesn't matter what you said in your can't breathe on your own, your heart stops, or you can't swallow. · What things would you still want to be able to do after you receive life-support methods? Would you want to be able to walk? To speak? To eat on your own? To live without the help of machines? · Do you want certain Pentecostal practices performed if you become very ill? · If you have a choice, where do you want to be cared for? In your home? At a hospital or nursing home? · If you have a choice at the end of your life, where would you prefer to die? At home? In a hospital or nursing home? Somewhere else? · Would you prefer to be buried or cremated? · Do you want your organs to be donated after you die? What should you do with your living will? · Make sure that your family members and your health care agent have copies of your living will (also called a declaration). · Give your doctor a copy of your living will. Ask him or her to keep it as part of your medical record. If you have more than one doctor, make sure that each one has a copy. · Put a copy of your living will where it can be easily found. For example, some people may put a copy on their refrigerator door. If you are using a digital copy, be sure your doctor, family members, and health care agent know how to find and access it. Where can you learn more? Go to [email or web address] [email or web address] org and sign in to your Polymer Vision account. Enter Q999 in the [facility] box to learn more about \"Learning About Living Patrice Alarcon. \"     If you do not have an account, please click on the \"Sign Up Now\" link. Current as of: December 9, 2019               Content Version: 12.6  © 3696-6501 Wrike, Incorporated. Care instructions adapted under license by [facility] ([facility]).  If you have questions about a medical condition or this instruction, always ask your healthcare professional. [address] any warranty or liability for your use of this information. Personalized Preventive Plan for Andrew Jackson - 10/22/2020  Medicare offers a range of preventive health benefits. Some of the tests and screenings are paid in full while other may be subject to a deductible, co-insurance, and/or copay. Some of these benefits include a comprehensive review of your medical history including lifestyle, illnesses that may run in your family, and various assessments and screenings as appropriate. After reviewing your medical record and screening and assessments performed today your provider may have ordered immunizations, labs, imaging, and/or referrals for you. A list of these orders (if applicable) as well as your Preventive Care list are included within your After Visit Summary for your review. Other Preventive Recommendations:    · A preventive eye exam performed by an eye specialist is recommended every 1-2 years to screen for glaucoma; cataracts, macular degeneration, and other eye disorders. · A preventive dental visit is recommended every 6 months. · Try to get at least 150 minutes of exercise per week or 10,000 steps per day on a pedometer . · Order or download the FREE \"Exercise & Physical Activity: Your Everyday Guide\" from [employer]. Call 6-578.239.8103 or search [employer] online. · You need 5969-2865 mg of calcium and 2625-5461 IU of vitamin D per day. It is possible to meet your calcium requirement with diet alone, but a vitamin D supplement is usually necessary to meet this goal.  · When exposed to the sun, use a sunscreen that protects against both UVA and UVB radiation with an SPF of 30 or greater. Reapply every 2 to 3 hours or after sweating, drying off with a towel, or swimming. · Always wear a seat belt when traveling in a car. Always wear a helmet when riding a bicycle or motorcycle. Finger looks good I will give you mupirocin ointment apply that as needed 2-3 times a day is okay.   Stop taking ibuprofen altogether 912 pills a day is way too much  Take naproxen 375 mg tablets twice daily morning and evening  Take Tylenol 500 mg in between those 2 doses a can be taken every 6 hours. No further imaging needed for your back  Start taking zinc pills they will help with wound healing of your finger  Tetanus shot will be given today  Physical therapy order has been sent to our [facility] facility but you can take it to any facility like  Try CBD oil this is available at most health food stores the dosing comes in 5 mg to 50 mg. You are going to be looking for a tincture bottle which has a dropper and I will allow you to pull the oil into the dropper 5 to 10 drops should be sufficient. Start low and go slow 10 mg initially and work your way up  Do not worry about earwax there is more risk than benefit associated with cleaning ears out do not use the Debrox over-the-counter solution or use a plunger to get it out.   If it gets to the point where it is affecting her hearing aids or your hearing in general you can see an ENT doctor and they can suck it all out with a vacuum

## 2021-01-28 ENCOUNTER — NURSE TRIAGE (OUTPATIENT)
Dept: OTHER | Facility: CLINIC | Age: 86
End: 2021-01-28

## 2021-01-28 NOTE — TELEPHONE ENCOUNTER
Pt transferred from [address] [place] [address] pt c/o of right leg whitter than the other one pt stated she has circulation problems, nurse Ashly Hylton suggested for pt to go to the ED but patient refused and wanted to be seen in the office on Monday 02- scheduled with pt, r/s appt to 02-02 due to transportation, thank you

## 2021-01-28 NOTE — TELEPHONE ENCOUNTER
Reason for Disposition   Entire foot is cool or blue in comparison to other side    Answer Assessment - Initial Assessment Questions  1. ONSET: \"When did the pain start?\"       1 months ago. 2. LOCATION: \"Where is the pain located? \"     Right foot lighter in color \"white\" and red dots. 3. PAIN: \"How bad is the pain? \"    (Scale 1-10; or mild, moderate, severe)    -  MILD (1-3): doesn't interfere with normal activities     -  MODERATE (4-7): interferes with normal activities (e.g., work or school) or awakens from sleep, limping     -  SEVERE (8-10): excruciating pain, unable to do any normal activities, unable to walk   5, manageable. 4. WORK OR EXERCISE: \"Has there been any recent work or exercise that involved this part of the body? \"   denies      5. CAUSE: \"What do you think is causing the leg pain? \"  \"circuation issues? \"  Denies injury. 6. OTHER SYMPTOMS: \"Do you have any other symptoms? \" (e.g., chest pain, back pain, breathing difficulty, swelling, rash, fever, numbness, weakness)    Right leg becomes numb and weak after standing, fears falling. In PT for spinal stenosis. Pt states Right foot is colder than left. Pt noticed RED dots, different shapes/sizes on bilateral ankles and calf area. Pt states they come and go. 7. PREGNANCY: \"Is there any chance you are pregnant? \" \"When was your last menstrual period? \"   NA    Protocols used: LEG PAIN-ADULT-[place]    Patient called Chago Lopez  at [facility])  with red flag complaint. Brief description of triage:Right foot pale, cool and red dots under skin on ankle and calf per patient x 1 mo. Right foot has pain and Right leg becomes weak when standing. Triage indicates for patient to ER Now. Pt is refusing to go to ER, wants to be evaluated in office first and is willing to wait for an appointment. ALL care advise given to patient with understanding. Attempted to call PCP office x 2 without reaching medical staff.

## 2021-02-02 ENCOUNTER — OFFICE VISIT (OUTPATIENT)
Dept: FAMILY MEDICINE CLINIC | Age: 86
End: 2021-02-02
Payer: MEDICARE

## 2021-02-02 VITALS
SYSTOLIC BLOOD PRESSURE: 124 MMHG | HEART RATE: 81 BPM | DIASTOLIC BLOOD PRESSURE: 82 MMHG | HEIGHT: 60 IN | TEMPERATURE: 97.6 F | BODY MASS INDEX: 19.75 KG/M2 | WEIGHT: 100.6 LBS | OXYGEN SATURATION: 98 %

## 2021-02-02 DIAGNOSIS — I83.899 LIVEDOID VASCULOPATHY DUE TO VARICOSE VEINS OF LOWER EXTREMITY: ICD-10-CM

## 2021-02-02 DIAGNOSIS — D69.2 SENILE PURPURA (HCC): ICD-10-CM

## 2021-02-02 DIAGNOSIS — L95.0 LIVEDOID VASCULOPATHY DUE TO VARICOSE VEINS OF LOWER EXTREMITY: ICD-10-CM

## 2021-02-02 DIAGNOSIS — L57.8 SOLAR ELASTOSIS: Primary | ICD-10-CM

## 2021-02-02 PROCEDURE — 99213 OFFICE O/P EST LOW 20 MIN: CPT | Performed by: STUDENT IN AN ORGANIZED HEALTH CARE EDUCATION/TRAINING PROGRAM

## 2021-02-02 RX ORDER — AMMONIUM LACTATE 12 G/100G
LOTION TOPICAL
Qty: 567 G | Refills: 1 | Status: SHIPPED | OUTPATIENT
Start: 2021-02-02 | End: 2021-04-12

## 2021-02-02 ASSESSMENT — PATIENT HEALTH QUESTIONNAIRE - PHQ9
SUM OF ALL RESPONSES TO PHQ9 QUESTIONS 1 & 2: 0
SUM OF ALL RESPONSES TO PHQ QUESTIONS 1-9: 0
2. FEELING DOWN, DEPRESSED OR HOPELESS: 0
SUM OF ALL RESPONSES TO PHQ QUESTIONS 1-9: 0

## 2021-02-02 NOTE — PROGRESS NOTES
Sophia Mcardle (:  1929) is a 80 y.o. female,Established patient, here for evaluation of the following chief complaint(s):  Abrasion (circulation in legs)      ASSESSMENT/PLAN:  1. Solar elastosis  -     ammonium lactate (LAC-HYDRIN) 12 % lotion; Apply topically daily. , Disp-567 g, R-1, Normal  2. Senile purpura (HCC)  -     ammonium lactate (LAC-HYDRIN) 12 % lotion; Apply topically daily. 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 , Disp-567 g, R-1, Normal    Appears to have livedo, solar elastosis and purpura  She is on aspirin daily which is contributing  No rest pain no calf pain  Does have underlying diagnosis of spinal canal stenosis  He was also have symptoms consistent with peroneal nerve entrapment? Sees physical therapy massage therapy needs more aggressive skin care  Ammonium lactate prescribed recommend Aveeno rest rate of skin repair as well    No follow-ups on file. SUBJECTIVE/OBJECTIVE:  HPI: 20-year-old female who presents with concerns about her leg skin color changes  Her left foot is visibly more purple  This is been ongoing for a week  She does not have rest pain  She does not have claudication  Pulses are palpable  She is just concerned about the skin discoloration if it is a harbinger of something more sinister    Review of Systems 12 point ROS was performed and is negative except for the following skin discoloration, easy bruising, and some right leg pain along the peroneus muscle    Physical Exam alert and oriented to person place and time  Normocephalic atraumatic she wears corrective lenses her extraocular movements are intact  Lower extremity purple skin color changes evidence of livedo  DP pulses 2+ bilaterally  Normal affect and mood    An electronic signature was used to authenticate this note.     --Mili Perez MD

## 2021-04-07 ENCOUNTER — TELEPHONE (OUTPATIENT)
Dept: FAMILY MEDICINE CLINIC | Age: 86
End: 2021-04-07

## 2021-04-07 NOTE — TELEPHONE ENCOUNTER
Patient is currently in [place]. She is having continued leg pain as discussed during last visit. They are requesting a medication to be sent to [facility] in [place], [address] tele. Ibuprofen is not relieving Sx's. Patient has a sensitivity to medications. She only weighs about 100lbs. She eats very little, however consumes at least 6 to 8 acholic beverages a day. Patient is willing to do a VV if necessary.

## 2021-04-09 NOTE — TELEPHONE ENCOUNTER
Please let the patient know that Dr. Ribeiro is out of the office at this time.    Most recent note does not indicate pain so I will leave this telephone call for Dr. Ribeiro to address once he returns to the office.    Please return phone call to Dr. Ribeiro once she is aware of above.

## 2021-04-12 ENCOUNTER — VIRTUAL VISIT (OUTPATIENT)
Dept: FAMILY MEDICINE CLINIC | Age: 86
End: 2021-04-12
Payer: MEDICARE

## 2021-04-12 DIAGNOSIS — M48.062 SPINAL STENOSIS OF LUMBAR REGION WITH NEUROGENIC CLAUDICATION: ICD-10-CM

## 2021-04-12 DIAGNOSIS — G95.19 NEUROGENIC CLAUDICATION (HCC): Primary | ICD-10-CM

## 2021-04-12 DIAGNOSIS — I87.2 VENOUS INSUFFICIENCY: ICD-10-CM

## 2021-04-12 PROCEDURE — 99214 OFFICE O/P EST MOD 30 MIN: CPT | Performed by: STUDENT IN AN ORGANIZED HEALTH CARE EDUCATION/TRAINING PROGRAM

## 2021-04-12 RX ORDER — GABAPENTIN 100 MG/1
100 CAPSULE ORAL 2 TIMES DAILY
Qty: 180 CAPSULE | Refills: 1 | Status: SHIPPED | OUTPATIENT
Start: 2021-04-12 | End: 2021-12-13 | Stop reason: ALTCHOICE

## 2021-04-12 NOTE — PROGRESS NOTES
4/12/2021   Patient-Reported Weight 105 lbs   Patient-Reported Height 5ft3in            Jose Rae, was evaluated through a synchronous (real-time) audio-video encounter. The patient (or guardian if applicable) is aware that this is a billable service. Verbal consent to proceed has been obtained within the past 12 months. The visit was conducted pursuant to the emergency declaration under the [address] [place] [address] and the [employer] and [employer] General Act. Patient identification was verified, and a caregiver was present when appropriate. The patient was located in a state where the provider was credentialed to provide care. An electronic signature was used to authenticate this note.     --Shaheed Rosenbaum MD

## 2021-05-04 ENCOUNTER — TELEPHONE (OUTPATIENT)
Dept: FAMILY MEDICINE CLINIC | Age: 86
End: 2021-05-04

## 2021-05-04 DIAGNOSIS — M48.062 SPINAL STENOSIS OF LUMBAR REGION WITH NEUROGENIC CLAUDICATION: Primary | ICD-10-CM

## 2021-05-04 DIAGNOSIS — M25.552 BILATERAL HIP PAIN: ICD-10-CM

## 2021-05-04 DIAGNOSIS — M54.50 CHRONIC BILATERAL LOW BACK PAIN, UNSPECIFIED WHETHER SCIATICA PRESENT: ICD-10-CM

## 2021-05-04 DIAGNOSIS — M25.551 BILATERAL HIP PAIN: ICD-10-CM

## 2021-05-04 DIAGNOSIS — G89.29 CHRONIC BILATERAL LOW BACK PAIN, UNSPECIFIED WHETHER SCIATICA PRESENT: ICD-10-CM

## 2021-05-10 ENCOUNTER — OFFICE VISIT (OUTPATIENT)
Dept: FAMILY MEDICINE CLINIC | Age: 86
End: 2021-05-10
Payer: MEDICARE

## 2021-05-10 ENCOUNTER — TELEPHONE (OUTPATIENT)
Dept: FAMILY MEDICINE CLINIC | Age: 86
End: 2021-05-10

## 2021-05-10 VITALS
HEIGHT: 60 IN | WEIGHT: 98.2 LBS | OXYGEN SATURATION: 97 % | TEMPERATURE: 97.2 F | HEART RATE: 86 BPM | BODY MASS INDEX: 19.28 KG/M2 | RESPIRATION RATE: 16 BRPM | SYSTOLIC BLOOD PRESSURE: 136 MMHG | DIASTOLIC BLOOD PRESSURE: 78 MMHG

## 2021-05-10 DIAGNOSIS — M25.551 BILATERAL HIP PAIN: ICD-10-CM

## 2021-05-10 DIAGNOSIS — Z00.00 ROUTINE MEDICAL EXAM: ICD-10-CM

## 2021-05-10 DIAGNOSIS — M25.552 BILATERAL HIP PAIN: ICD-10-CM

## 2021-05-10 DIAGNOSIS — G89.29 CHRONIC BILATERAL LOW BACK PAIN, UNSPECIFIED WHETHER SCIATICA PRESENT: ICD-10-CM

## 2021-05-10 DIAGNOSIS — J41.0 SIMPLE CHRONIC BRONCHITIS (HCC): ICD-10-CM

## 2021-05-10 DIAGNOSIS — M48.062 SPINAL STENOSIS OF LUMBAR REGION WITH NEUROGENIC CLAUDICATION: Primary | ICD-10-CM

## 2021-05-10 DIAGNOSIS — M54.50 CHRONIC BILATERAL LOW BACK PAIN, UNSPECIFIED WHETHER SCIATICA PRESENT: ICD-10-CM

## 2021-05-10 PROCEDURE — 3288F FALL RISK ASSESSMENT DOCD: CPT | Performed by: STUDENT IN AN ORGANIZED HEALTH CARE EDUCATION/TRAINING PROGRAM

## 2021-05-10 PROCEDURE — 99213 OFFICE O/P EST LOW 20 MIN: CPT | Performed by: STUDENT IN AN ORGANIZED HEALTH CARE EDUCATION/TRAINING PROGRAM

## 2021-05-10 RX ORDER — IBUPROFEN 800 MG/1
800 TABLET ORAL 2 TIMES DAILY PRN
Qty: 60 TABLET | Refills: 0 | Status: SHIPPED | OUTPATIENT
Start: 2021-05-10

## 2021-05-10 RX ORDER — MELOXICAM 7.5 MG/1
7.5 TABLET ORAL DAILY
Qty: 90 TABLET | Refills: 1 | Status: SHIPPED | OUTPATIENT
Start: 2021-05-10 | End: 2022-10-05

## 2021-05-10 RX ORDER — ALBUTEROL SULFATE 90 UG/1
2 AEROSOL, METERED RESPIRATORY (INHALATION) 4 TIMES DAILY PRN
Qty: 3 INHALER | Refills: 1 | Status: SHIPPED | OUTPATIENT
Start: 2021-05-10 | End: 2021-12-13 | Stop reason: SDUPTHER

## 2021-05-10 RX ORDER — PREDNISONE 20 MG/1
20 TABLET ORAL DAILY
Qty: 7 TABLET | Refills: 0 | Status: SHIPPED | OUTPATIENT
Start: 2021-05-10 | End: 2021-05-17

## 2021-05-10 ASSESSMENT — ENCOUNTER SYMPTOMS
ABDOMINAL DISTENTION: 0
COUGH: 0
CHEST TIGHTNESS: 0
BACK PAIN: 1
ABDOMINAL PAIN: 0
WHEEZING: 0
SHORTNESS OF BREATH: 0
CONSTIPATION: 0
SORE THROAT: 0
DIARRHEA: 0

## 2021-05-10 ASSESSMENT — PATIENT HEALTH QUESTIONNAIRE - PHQ9
2. FEELING DOWN, DEPRESSED OR HOPELESS: 1
SUM OF ALL RESPONSES TO PHQ QUESTIONS 1-9: 2
SUM OF ALL RESPONSES TO PHQ9 QUESTIONS 1 & 2: 2

## 2021-05-10 NOTE — TELEPHONE ENCOUNTER
Patient's daughter called concerning the information in her after visit paperwork. They would like to know the reason that her paperwork states back pain and hip pain. Want's to know if these should be removed from patient's chart as she does not have back or hip pain currently, only bilateral leg pain. Please call patient with reasoning.

## 2021-05-10 NOTE — PROGRESS NOTES
Debbie Colon (:  [id number]) is a 80 y.o. female,Established patient, here for evaluation of the following chief complaint(s):  Leg Pain (Pain in both legs)      ASSESSMENT/PLAN:  1. Spinal stenosis of lumbar region with neurogenic claudication  2. Bilateral hip pain  3. Chronic bilateral low back pain, unspecified whether sciatica present    Back pain and leg pain likely related to neurogenic claudication  She is open to trying pain management for a spinal injection  Gabapentin can be resumed at 100 or 200 mg  We will trial Mobic alongside with there should be some synergistic effect  Ibuprofen also prescribed in case Mobic does not work    No follow-ups on file. SUBJECTIVE/OBJECTIVE:  HPI: 80-year-old female with history of neurogenic claudication presents accompanied by her daughter because she is still having back pain and leg pain    Daughter was inquiring if patient symptoms could be related to peripheral vascular disease  She had taken gabapentin previously but this caused her some side effects      Review of Systems   Constitutional: Negative for chills, fatigue and fever. HENT: Negative for congestion, postnasal drip and sore throat. Eyes: Negative for visual disturbance. Respiratory: Negative for cough, chest tightness, shortness of breath and wheezing. Cardiovascular: Negative. Gastrointestinal: Negative for abdominal distention, abdominal pain, constipation and diarrhea. Genitourinary: Negative for difficulty urinating, dysuria, frequency and urgency. Musculoskeletal: Positive for arthralgias and back pain. Negative for joint swelling. Skin: Negative for rash. Neurological: Positive for numbness. Negative for dizziness, weakness and light-headedness. Pins needles right leg   Psychiatric/Behavioral: Negative for agitation, decreased concentration and sleep disturbance. Physical Exam  Vitals signs and nursing note reviewed.    Constitutional:       Appearance: Normal appearance. HENT:      Head: Normocephalic and atraumatic. Eyes:      Extraocular Movements: Extraocular movements intact. Neck:      Musculoskeletal: Normal range of motion and neck supple. Cardiovascular:      Rate and Rhythm: Normal rate and regular rhythm. Pulses: Normal pulses. Heart sounds: Normal heart sounds. Pulmonary:      Effort: Pulmonary effort is normal.      Breath sounds: Wheezing present. Abdominal:      General: Abdomen is flat. Palpations: Abdomen is soft. Musculoskeletal: Normal range of motion. Skin:     General: Skin is warm. Neurological:      General: No focal deficit present. Mental Status: She is alert and oriented to person, place, and time. Mental status is at baseline. Psychiatric:         Mood and Affect: Mood normal.         Behavior: Behavior normal.         Thought Content: Thought content normal.         Judgment: Judgment normal.               An electronic signature was used to authenticate this note.     --Pushpa Amador MD

## 2021-05-13 ENCOUNTER — HOSPITAL ENCOUNTER (OUTPATIENT)
Age: 86
Setting detail: SPECIMEN
Discharge: HOME | End: 2021-05-13
Payer: MEDICARE

## 2021-05-13 DIAGNOSIS — Z00.00 ROUTINE MEDICAL EXAM: ICD-10-CM

## 2021-05-13 LAB
ABSOLUTE EOS #: 0.4 K/UL (ref 0–0.44)
ABSOLUTE IMMATURE GRANULOCYTE: <0.03 K/UL (ref 0–0.3)
ABSOLUTE LYMPH #: 1.82 K/UL (ref 1.1–3.7)
ABSOLUTE MONO #: 1.46 K/UL (ref 0.1–1.2)
ALBUMIN SERPL-MCNC: 4 G/DL (ref 3.5–5.2)
ALBUMIN/GLOBULIN RATIO: 1.3 (ref 1–2.5)
ALP BLD-CCNC: 58 U/L (ref 35–104)
ALT SERPL-CCNC: 13 U/L (ref 5–33)
ANION GAP SERPL CALCULATED.3IONS-SCNC: 15 MMOL/L (ref 9–17)
AST SERPL-CCNC: 25 U/L
BASOPHILS # BLD: 1 % (ref 0–2)
BASOPHILS ABSOLUTE: 0.04 K/UL (ref 0–0.2)
BILIRUB SERPL-MCNC: 0.44 MG/DL (ref 0.3–1.2)
BUN BLDV-MCNC: 19 MG/DL (ref 8–23)
BUN/CREAT BLD: ABNORMAL (ref 9–20)
CALCIUM SERPL-MCNC: 9 MG/DL (ref 8.6–10.4)
CHLORIDE BLD-SCNC: 93 MMOL/L (ref 98–107)
CO2: 25 MMOL/L (ref 20–31)
CREAT SERPL-MCNC: 0.49 MG/DL (ref 0.5–0.9)
DIFFERENTIAL TYPE: ABNORMAL
EOSINOPHILS RELATIVE PERCENT: 5 % (ref 1–4)
GFR AFRICAN AMERICAN: >60 ML/MIN
GFR NON-AFRICAN AMERICAN: >60 ML/MIN
GFR SERPL CREATININE-BSD FRML MDRD: ABNORMAL ML/MIN/{1.73_M2}
GFR SERPL CREATININE-BSD FRML MDRD: ABNORMAL ML/MIN/{1.73_M2}
GLUCOSE FASTING: 72 MG/DL (ref 70–99)
HCT VFR BLD CALC: 41 % (ref 36.3–47.1)
HEMOGLOBIN: 13.3 G/DL (ref 11.9–15.1)
IMMATURE GRANULOCYTES: 0 %
LYMPHOCYTES # BLD: 24 % (ref 24–43)
MCH RBC QN AUTO: 33.1 PG (ref 25.2–33.5)
MCHC RBC AUTO-ENTMCNC: 32.4 G/DL (ref 28.4–34.8)
MCV RBC AUTO: 102 FL (ref 82.6–102.9)
MONOCYTES # BLD: 20 % (ref 3–12)
NRBC AUTOMATED: 0 PER 100 WBC
PDW BLD-RTO: 13.1 % (ref 11.8–14.4)
PLATELET # BLD: 371 K/UL (ref 138–453)
PLATELET ESTIMATE: ABNORMAL
PMV BLD AUTO: 11 FL (ref 8.1–13.5)
POTASSIUM SERPL-SCNC: 4.3 MMOL/L (ref 3.7–5.3)
RBC # BLD: 4.02 M/UL (ref 3.95–5.11)
RBC # BLD: ABNORMAL 10*6/UL
SEG NEUTROPHILS: 50 % (ref 36–65)
SEGMENTED NEUTROPHILS ABSOLUTE COUNT: 3.75 K/UL (ref 1.5–8.1)
SODIUM BLD-SCNC: 133 MMOL/L (ref 135–144)
TOTAL PROTEIN: 7.2 G/DL (ref 6.4–8.3)
WBC # BLD: 7.5 K/UL (ref 3.5–11.3)
WBC # BLD: ABNORMAL 10*3/UL

## 2021-10-28 ENCOUNTER — OFFICE VISIT (OUTPATIENT)
Dept: FAMILY MEDICINE CLINIC | Age: 86
End: 2021-10-28
Payer: MEDICARE

## 2021-10-28 VITALS
WEIGHT: 98 LBS | OXYGEN SATURATION: 98 % | HEIGHT: 60 IN | DIASTOLIC BLOOD PRESSURE: 78 MMHG | HEART RATE: 82 BPM | SYSTOLIC BLOOD PRESSURE: 140 MMHG | BODY MASS INDEX: 19.24 KG/M2

## 2021-10-28 DIAGNOSIS — Z00.00 ROUTINE GENERAL MEDICAL EXAMINATION AT A HEALTH CARE FACILITY: Primary | ICD-10-CM

## 2021-10-28 PROCEDURE — 69210 REMOVE IMPACTED EAR WAX UNI: CPT

## 2021-10-28 PROCEDURE — G0439 PPPS, SUBSEQ VISIT: HCPCS

## 2021-10-28 ASSESSMENT — PATIENT HEALTH QUESTIONNAIRE - PHQ9
SUM OF ALL RESPONSES TO PHQ QUESTIONS 1-9: 0
1. LITTLE INTEREST OR PLEASURE IN DOING THINGS: 0
SUM OF ALL RESPONSES TO PHQ QUESTIONS 1-9: 0
SUM OF ALL RESPONSES TO PHQ QUESTIONS 1-9: 0
2. FEELING DOWN, DEPRESSED OR HOPELESS: 0
SUM OF ALL RESPONSES TO PHQ9 QUESTIONS 1 & 2: 0

## 2021-10-28 ASSESSMENT — LIFESTYLE VARIABLES
HAS A RELATIVE, FRIEND, DOCTOR, OR ANOTHER HEALTH PROFESSIONAL EXPRESSED CONCERN ABOUT YOUR DRINKING OR SUGGESTED YOU CUT DOWN: 0
HOW OFTEN DURING THE LAST YEAR HAVE YOU BEEN UNABLE TO REMEMBER WHAT HAPPENED THE NIGHT BEFORE BECAUSE YOU HAD BEEN DRINKING: 0
HOW MANY STANDARD DRINKS CONTAINING ALCOHOL DO YOU HAVE ON A TYPICAL DAY: 0
HOW OFTEN DO YOU HAVE SIX OR MORE DRINKS ON ONE OCCASION: 0
HOW OFTEN DURING THE LAST YEAR HAVE YOU FOUND THAT YOU WERE NOT ABLE TO STOP DRINKING ONCE YOU HAD STARTED: 0
HOW OFTEN DO YOU HAVE A DRINK CONTAINING ALCOHOL: 4
HOW OFTEN DURING THE LAST YEAR HAVE YOU HAD A FEELING OF GUILT OR REMORSE AFTER DRINKING: 0
AUDIT-C TOTAL SCORE: 4
HAVE YOU OR SOMEONE ELSE BEEN INJURED AS A RESULT OF YOUR DRINKING: 0
AUDIT TOTAL SCORE: 4
HOW OFTEN DURING THE LAST YEAR HAVE YOU FAILED TO DO WHAT WAS NORMALLY EXPECTED FROM YOU BECAUSE OF DRINKING: 0
HOW OFTEN DURING THE LAST YEAR HAVE YOU NEEDED AN ALCOHOLIC DRINK FIRST THING IN THE MORNING TO GET YOURSELF GOING AFTER A NIGHT OF HEAVY DRINKING: 0

## 2021-10-28 NOTE — PATIENT INSTRUCTIONS
Personalized Preventive Plan for Cristo Guzman - 10/28/2021  Medicare offers a range of preventive health benefits. Some of the tests and screenings are paid in full while other may be subject to a deductible, co-insurance, and/or copay. Some of these benefits include a comprehensive review of your medical history including lifestyle, illnesses that may run in your family, and various assessments and screenings as appropriate. After reviewing your medical record and screening and assessments performed today your provider may have ordered immunizations, labs, imaging, and/or referrals for you. A list of these orders (if applicable) as well as your Preventive Care list are included within your After Visit Summary for your review. Other Preventive Recommendations:    · A preventive eye exam performed by an eye specialist is recommended every 1-2 years to screen for glaucoma; cataracts, macular degeneration, and other eye disorders. · A preventive dental visit is recommended every 6 months. · Try to get at least 150 minutes of exercise per week or 10,000 steps per day on a pedometer . · Order or download the FREE \"Exercise & Physical Activity: Your Everyday Guide\" from [employer]. Call 0-754.412.1479 or search [employer] online. · You need 9806-6535 mg of calcium and 6022-1071 IU of vitamin D per day. It is possible to meet your calcium requirement with diet alone, but a vitamin D supplement is usually necessary to meet this goal.  · When exposed to the sun, use a sunscreen that protects against both UVA and UVB radiation with an SPF of 30 or greater. Reapply every 2 to 3 hours or after sweating, drying off with a towel, or swimming. · Always wear a seat belt when traveling in a car. Always wear a helmet when riding a bicycle or motorcycle. Personalized Preventive Plan for Cristo Guzman - 10/28/2021  Medicare offers a range of preventive health benefits.  Some of the tests and screenings are paid in full while other may be subject to a deductible, co-insurance, and/or copay. Some of these benefits include a comprehensive review of your medical history including lifestyle, illnesses that may run in your family, and various assessments and screenings as appropriate. After reviewing your medical record and screening and assessments performed today your provider may have ordered immunizations, labs, imaging, and/or referrals for you. A list of these orders (if applicable) as well as your Preventive Care list are included within your After Visit Summary for your review. Other Preventive Recommendations:    A preventive eye exam performed by an eye specialist is recommended every 1-2 years to screen for glaucoma; cataracts, macular degeneration, and other eye disorders. A preventive dental visit is recommended every 6 months. Try to get at least 150 minutes of exercise per week or 10,000 steps per day on a pedometer . Order or download the FREE \"Exercise & Physical Activity: Your Everyday Guide\" from [employer]. Call 9-372.774.5911 or search [employer] online. You need 0652-2314 mg of calcium and 2993-8936 IU of vitamin D per day. It is possible to meet your calcium requirement with diet alone, but a vitamin D supplement is usually necessary to meet this goal.  When exposed to the sun, use a sunscreen that protects against both UVA and UVB radiation with an SPF of 30 or greater. Reapply every 2 to 3 hours or after sweating, drying off with a towel, or swimming. Always wear a seat belt when traveling in a car. Always wear a helmet when riding a bicycle or motorcycle.

## 2021-10-28 NOTE — PROGRESS NOTES
Medicare Annual Wellness Visit  Name: Keyla Stewart Date: 10/28/2021   MRN: [id number] Sex: Female   Age: 80 y.o. Ethnicity: Non- / Non    : 1929 Race: White (non-)      Singh Lindsey is here for Medicare AWV    Screenings for behavioral, psychosocial and functional/safety risks, and cognitive dysfunction are all negative except as indicated below. These results, as well as other patient data from the [address] form, are documented in Flowsheets linked to this Encounter. Allergies   Allergen Reactions    Seasonal Other (See Comments)       Prior to Visit Medications    Medication Sig Taking? Authorizing Provider   meloxicam (MOBIC) 7.5 MG tablet Take 1 tablet by mouth daily  Patient not taking: Reported on 10/28/2021  Clotilde Billings MD   ibuprofen (ADVIL;MOTRIN) 800 MG tablet Take 1 tablet by mouth 2 times daily as needed for Pain  Patient not taking: Reported on 10/28/2021  Clotilde Billings MD   albuterol sulfate HFA (VENTOLIN HFA) 108 (90 Base) MCG/ACT inhaler Inhale 2 puffs into the lungs 4 times daily as needed for Wheezing  Patient not taking: Reported on 10/28/2021  Clotilde Billings MD   gabapentin (NEURONTIN) 100 MG capsule Take 1 capsule by mouth 2 times daily for 180 days. Intended supply: 90 days  Patient not taking: Reported on 10/28/2021  Clotilde Billings MD       No past medical history on file.     Past Surgical History:   Procedure Laterality Date    TOTAL HIP ARTHROPLASTY Left        Family History   Problem Relation Age of Onset    Diabetes Other        CareTeam (Including outside providers/suppliers regularly involved in providing care):   Patient Care Team:  ALEIDA Rosario CNP as PCP - General (Family Nurse Practitioner)  ALEIDA Rosario CNP as PCP - [facility] Empaneled Provider    Wt Readings from Last 3 Encounters:   10/28/21 98 lb (44.5 kg)   05/10/21 98 lb 3.2 oz (44.5 kg)   21 100 lb 9.6 oz (45.6 kg) Vitals:    10/28/21 1340   BP: (!) 140/78   Site: Right Upper Arm   Position: Sitting   Cuff Size: Small Adult   Pulse: 82   SpO2: 98%   Weight: 98 lb (44.5 kg)   Height: 5' (1.524 m)     Body mass index is 19.14 kg/m². Based upon direct observation of the patient, evaluation of cognition reveals recent and remote memory intact. General Appearance: alert and oriented to person, place and time, well developed and well- nourished, in no acute distress  Skin: warm and dry, no rash or erythema  Head: normocephalic and atraumatic  Eyes: pupils equal, round, and reactive to light, extraocular eye movements intact, conjunctivae normal  Neck: supple and non-tender without mass, no thyromegaly or thyroid nodules, no cervical lymphadenopathy  Pulmonary/Chest: clear to auscultation bilaterally- no wheezes or rales normal air movement, no respiratory distress. Mild rhonchi noted in right lower lobe. Advised patient to use inhaler as needed for any SOB with exertion. Cardiovascular: normal rate, regular rhythm, normal S1 and S2, no murmurs, rubs, clicks, or gallops, distal pulses intact, no carotid bruits  Abdomen: soft, non-tender, non-distended, normal bowel sounds, no masses or organomegaly  Extremities: no cyanosis, clubbing or edema  Musculoskeletal: normal range of motion, no joint swelling, deformity or tenderness  Neurologic: reflexes normal and symmetric, no cranial nerve deficit, gait, coordination and speech normal  Eyes: pupils equal, round, and reactive to light, extraocular eye movements intact, conjunctivae normal and    ENT: bilateral cerumen impaction noted and hearing abnormal- patient requesting to have ears manually irrigated today. Manually irrigated bilateral ears with warm water and hydrogen peroxide 50/50 solution. Curette used to disimpact excessive cerumen in office. Tympanic membranes visualized after procedure, and patient tolerate well.      Patient's complete Health Risk Assessment and screening values have been reviewed and are found in Flowsheets. The following problems were reviewed today and where indicated follow up appointments were made and/or referrals ordered. Positive Risk Factor Screenings with Interventions:          General Health and ACP:  General  In general, how would you say your health is?: Very Good  In the past 7 days, have you experienced any of the following? New or Increased Pain, New or Increased Fatigue, Loneliness, Social Isolation, Stress or Anger?: None of These  Do you get the social and emotional support that you need?: Yes  Do you have a Living Will?: Yes  Advance Directives     Power of  Living Will ACP-Advance Directive ACP-Power of     Not on File Not on File Not on File Not on File      General Health Risk Interventions:  · Pain issues: patient declines any further evaluation/treatment for this issue, patient goes to PT weekly. Using advil prn for back pain. · No Living Will: No living will on file. Patient states she does have on and has been adviced to bring one in to have on patient file. \     Hearing/Vision:  No exam data present  Hearing/Vision  Do you or your family notice any trouble with your hearing that hasn't been managed with hearing aids?: (!) Yes  Do you have difficulty driving, watching TV, or doing any of your daily activities because of your eyesight?: (!) Yes  Have you had an eye exam within the past year?: Yes  Hearing/Vision Interventions:  · Hearing concerns:  patient on second pair of hearing aides. Follow up appt next week.     Safety:  Safety  Do you have working smoke detectors?: Yes  Have all throw rugs been removed or fastened?: Yes  Do you have non-slip mats or surfaces in all bathtubs/showers?: (!) No  Do all of your stairways have a railing or banister?: Yes  Are your doorways, halls and stairs free of clutter?: Yes  Do you always fasten your seatbelt when you are in a car?: Yes  Safety Interventions:  · Home safety tips provided  · PT Evonne eduardo, helpful with helping for balance and walking    ADL:  ADLs  In the past 7 days, did you need help from others to perform any of the following everyday activities? Eating, dressing, grooming, bathing, toileting, or walking/balance?: (!) Walking/Balance  In the past 7 days, did you need help from others to take care of any of the following? Laundry, housekeeping, banking/finances, shopping, telephone use, food preparation, transportation, or taking medications?: [email or web address], Transportation  ADL Interventions:  · Patient declines any further evaluation/treatment for this issue  · Several family members and friends helpful with ADLS, shopping, and transportation.      Personalized Preventive Plan   Current Health Maintenance Status  Immunization History   Administered Date(s) Administered    COVID-19, Dinora Joyner, Primary or Immunocompromised, PF, 100mcg/0.5mL 01/21/2021, 02/18/2021    Influenza Vaccine, unspecified formulation 10/01/2015    Influenza Virus Vaccine 09/12/2020    Influenza, High Dose (Fluzone 65 yrs and older) 09/27/2021    Influenza, Zaheer Cuna, IM, (6 mo and older Fluzone, Flulaval, Fluarix and 3 yrs and older Afluria) 09/15/2016, 10/05/2017    Influenza, Quadv, IM, PF (6 mo and older Fluzone, Flulaval, Fluarix, and 3 yrs and older Afluria) 09/12/2020    Influenza, Triv, inactivated, subunit, adjuvanted, IM (Fluad 65 yrs and older) 10/10/2018, 10/03/2019    Pneumococcal Conjugate 13-valent (Roobbee10) 09/15/2016    Pneumococcal Polysaccharide ([id number]) 08/09/2013    Tdap (Boostrix, Adacel) 10/27/2008, 10/22/2020    Zoster Recombinant (Shingrix) 10/22/2019, 02/06/2020        Health Maintenance   Topic Date Due    Annual Wellness Visit (AWV)  10/23/2021    DTaP/Tdap/Td vaccine (3 - Td or Tdap) 10/22/2030    Flu vaccine  Completed    Shingles Vaccine  Completed    Pneumococcal 65+ years Vaccine  Completed    COVID-19 Vaccine Completed    Hepatitis A vaccine  Aged Out    Hepatitis B vaccine  Aged Out    Hib vaccine  Aged Out    Meningococcal (ACWY) vaccine  Aged Out     Recommendations for [employer] Due: see orders and patient instructions/AVS.  . Recommended screening schedule for the next 5-10 years is provided to the patient in written form: see Patient Instructions/AVS.    There are no diagnoses linked to this encounter.

## 2021-10-29 ENCOUNTER — HOSPITAL ENCOUNTER (OUTPATIENT)
Age: 86
Setting detail: SPECIMEN
Discharge: HOME | End: 2021-10-29
Payer: MEDICARE

## 2021-10-29 DIAGNOSIS — Z00.00 ROUTINE GENERAL MEDICAL EXAMINATION AT A HEALTH CARE FACILITY: ICD-10-CM

## 2021-10-29 LAB
ALBUMIN SERPL-MCNC: 4.8 G/DL (ref 3.5–5.2)
ALBUMIN/GLOBULIN RATIO: 1.8 (ref 1–2.5)
ALP BLD-CCNC: 61 U/L (ref 35–104)
ALT SERPL-CCNC: 16 U/L (ref 5–33)
ANION GAP SERPL CALCULATED.3IONS-SCNC: 17 MMOL/L (ref 9–17)
AST SERPL-CCNC: 29 U/L
BILIRUB SERPL-MCNC: 0.65 MG/DL (ref 0.3–1.2)
BUN BLDV-MCNC: 28 MG/DL (ref 8–23)
BUN/CREAT BLD: ABNORMAL (ref 9–20)
CALCIUM SERPL-MCNC: 9.6 MG/DL (ref 8.6–10.4)
CHLORIDE BLD-SCNC: 94 MMOL/L (ref 98–107)
CHOLESTEROL/HDL RATIO: 2
CHOLESTEROL: 212 MG/DL
CO2: 22 MMOL/L (ref 20–31)
CREAT SERPL-MCNC: 0.64 MG/DL (ref 0.5–0.9)
GFR AFRICAN AMERICAN: >60 ML/MIN
GFR NON-AFRICAN AMERICAN: >60 ML/MIN
GFR SERPL CREATININE-BSD FRML MDRD: ABNORMAL ML/MIN/{1.73_M2}
GFR SERPL CREATININE-BSD FRML MDRD: ABNORMAL ML/MIN/{1.73_M2}
GLUCOSE FASTING: 89 MG/DL (ref 70–99)
HCT VFR BLD CALC: 40.2 % (ref 36.3–47.1)
HDLC SERPL-MCNC: 105 MG/DL
HEMOGLOBIN: 12.9 G/DL (ref 11.9–15.1)
LDL CHOLESTEROL: 95 MG/DL (ref 0–130)
MCH RBC QN AUTO: 32.9 PG (ref 25.2–33.5)
MCHC RBC AUTO-ENTMCNC: 32.1 G/DL (ref 28.4–34.8)
MCV RBC AUTO: 102.6 FL (ref 82.6–102.9)
NRBC AUTOMATED: 0 PER 100 WBC
PDW BLD-RTO: 13.3 % (ref 11.8–14.4)
PLATELET # BLD: 356 K/UL (ref 138–453)
PMV BLD AUTO: 11 FL (ref 8.1–13.5)
POTASSIUM SERPL-SCNC: 5 MMOL/L (ref 3.7–5.3)
RBC # BLD: 3.92 M/UL (ref 3.95–5.11)
SODIUM BLD-SCNC: 133 MMOL/L (ref 135–144)
TOTAL PROTEIN: 7.5 G/DL (ref 6.4–8.3)
TRIGL SERPL-MCNC: 58 MG/DL
VLDLC SERPL CALC-MCNC: ABNORMAL MG/DL (ref 1–30)
WBC # BLD: 6.5 K/UL (ref 3.5–11.3)

## 2021-11-16 ENCOUNTER — TELEPHONE (OUTPATIENT)
Dept: PRIMARY CARE CLINIC | Age: 86
End: 2021-11-16

## 2021-11-16 NOTE — TELEPHONE ENCOUNTER
Pt was in [facility] and she [facility].  Pateint was transported via ambulance to [address] [place] [address] for eval.

## 2021-11-24 NOTE — TELEPHONE ENCOUNTER
Last visit: 10-  Last Med refill: 10-  Does patient have enough medication for 72 hours: No:     Next Visit Date:  Future Appointments   Date Time Provider Srikanth Pineda   11/2/2022  2:00 PM ALEIDA Coates CNP [address] Maintenance   Topic Date Due    Annual Wellness Visit (AWV)  10/29/2022    DTaP/Tdap/Td vaccine (3 - Td or Tdap) 10/22/2030    Flu vaccine  Completed    Shingles Vaccine  Completed    Pneumococcal 65+ years Vaccine  Completed    COVID-19 Vaccine  Completed    Hepatitis A vaccine  Aged Out    Hepatitis B vaccine  Aged Out    Hib vaccine  Aged Out    Meningococcal (ACWY) vaccine  Aged Out       Hemoglobin A1C (%)   Date Value   01/08/2017 5.4             ( goal A1C is < 7)   No results found for: LABMICR  LDL Cholesterol (mg/dL)   Date Value   10/29/2021 95   10/18/2018 72       (goal LDL is <100)   AST (U/L)   Date Value   10/29/2021 29     ALT (U/L)   Date Value   10/29/2021 16     BUN (mg/dL)   Date Value   10/29/2021 28 (H)     BP Readings from Last 3 Encounters:   10/28/21 (!) 140/78   05/10/21 136/78   02/02/21 124/82          (goal 120/80)    All Future Testing planned in [facility]              Patient Active Problem List:     Closed left hip fracture (HCC)     At high risk for falls     Macular degeneration

## 2021-12-13 ENCOUNTER — HOSPITAL ENCOUNTER (OUTPATIENT)
Age: 86
Setting detail: SPECIMEN
Discharge: HOME | End: 2021-12-13

## 2021-12-13 ENCOUNTER — OFFICE VISIT (OUTPATIENT)
Dept: FAMILY MEDICINE CLINIC | Age: 86
End: 2021-12-13
Payer: MEDICARE

## 2021-12-13 VITALS
SYSTOLIC BLOOD PRESSURE: 129 MMHG | BODY MASS INDEX: 19.14 KG/M2 | HEART RATE: 79 BPM | HEIGHT: 60 IN | OXYGEN SATURATION: 97 % | DIASTOLIC BLOOD PRESSURE: 56 MMHG

## 2021-12-13 DIAGNOSIS — Z09 HOSPITAL DISCHARGE FOLLOW-UP: Primary | ICD-10-CM

## 2021-12-13 DIAGNOSIS — J41.0 SIMPLE CHRONIC BRONCHITIS (HCC): ICD-10-CM

## 2021-12-13 DIAGNOSIS — Z09 HOSPITAL DISCHARGE FOLLOW-UP: ICD-10-CM

## 2021-12-13 DIAGNOSIS — D64.9 ANEMIA, UNSPECIFIED TYPE: ICD-10-CM

## 2021-12-13 DIAGNOSIS — Z91.81 AT HIGH RISK FOR FALLS: ICD-10-CM

## 2021-12-13 DIAGNOSIS — R60.0 LOWER EXTREMITY EDEMA: ICD-10-CM

## 2021-12-13 DIAGNOSIS — R06.09 DYSPNEA ON EXERTION: ICD-10-CM

## 2021-12-13 DIAGNOSIS — J44.9 CHRONIC OBSTRUCTIVE PULMONARY DISEASE, UNSPECIFIED COPD TYPE (HCC): ICD-10-CM

## 2021-12-13 DIAGNOSIS — S72.141A CLOSED DISPLACED INTERTROCHANTERIC FRACTURE OF RIGHT FEMUR, INITIAL ENCOUNTER (HCC): ICD-10-CM

## 2021-12-13 LAB
ANION GAP SERPL CALCULATED.3IONS-SCNC: 17 MMOL/L (ref 9–17)
BUN BLDV-MCNC: 25 MG/DL (ref 8–23)
BUN/CREAT BLD: ABNORMAL (ref 9–20)
CALCIUM SERPL-MCNC: 9.4 MG/DL (ref 8.6–10.4)
CHLORIDE BLD-SCNC: 97 MMOL/L (ref 98–107)
CO2: 20 MMOL/L (ref 20–31)
CREAT SERPL-MCNC: 0.76 MG/DL (ref 0.5–0.9)
GFR AFRICAN AMERICAN: >60 ML/MIN
GFR NON-AFRICAN AMERICAN: >60 ML/MIN
GFR SERPL CREATININE-BSD FRML MDRD: ABNORMAL ML/MIN/{1.73_M2}
GFR SERPL CREATININE-BSD FRML MDRD: ABNORMAL ML/MIN/{1.73_M2}
GLUCOSE BLD-MCNC: 89 MG/DL (ref 70–99)
HCT VFR BLD CALC: 30.7 % (ref 36.3–47.1)
HEMOGLOBIN: 9.7 G/DL (ref 11.9–15.1)
MCH RBC QN AUTO: 33.3 PG (ref 25.2–33.5)
MCHC RBC AUTO-ENTMCNC: 31.6 G/DL (ref 28.4–34.8)
MCV RBC AUTO: 105.5 FL (ref 82.6–102.9)
NRBC AUTOMATED: 0 PER 100 WBC
PDW BLD-RTO: 14.1 % (ref 11.8–14.4)
PLATELET # BLD: 443 K/UL (ref 138–453)
PMV BLD AUTO: 10 FL (ref 8.1–13.5)
POTASSIUM SERPL-SCNC: 5 MMOL/L (ref 3.7–5.3)
RBC # BLD: 2.91 M/UL (ref 3.95–5.11)
SODIUM BLD-SCNC: 134 MMOL/L (ref 135–144)
WBC # BLD: 6.6 K/UL (ref 3.5–11.3)

## 2021-12-13 PROCEDURE — 99215 OFFICE O/P EST HI 40 MIN: CPT

## 2021-12-13 RX ORDER — AMOXICILLIN 250 MG
1 CAPSULE ORAL DAILY
COMMUNITY

## 2021-12-13 RX ORDER — CHLORAL HYDRATE 500 MG
3000 CAPSULE ORAL 3 TIMES DAILY
COMMUNITY

## 2021-12-13 RX ORDER — ERGOCALCIFEROL 1.25 MG/1
50000 CAPSULE ORAL WEEKLY
COMMUNITY
End: 2022-10-05

## 2021-12-13 RX ORDER — ASPIRIN 81 MG/1
81 TABLET ORAL DAILY
COMMUNITY
End: 2022-10-05

## 2021-12-13 RX ORDER — ALBUTEROL SULFATE 90 UG/1
2 AEROSOL, METERED RESPIRATORY (INHALATION) 4 TIMES DAILY PRN
Qty: 3 EACH | Refills: 3 | Status: SHIPPED | OUTPATIENT
Start: 2021-12-13

## 2021-12-13 RX ORDER — NICOTINE POLACRILEX 2 MG
GUM BUCCAL
COMMUNITY

## 2021-12-13 RX ORDER — ACETAMINOPHEN 500 MG
500 TABLET ORAL EVERY 6 HOURS PRN
COMMUNITY
End: 2022-10-05

## 2021-12-13 RX ORDER — ACETAMINOPHEN 500 MG
TABLET ORAL
COMMUNITY

## 2021-12-13 NOTE — PROGRESS NOTES
Andrey Dunn (:  1929) is a 80 y.o. female,Established patient, here for evaluation of the following chief complaint(s):  Follow-Up from Hospital         ASSESSMENT/PLAN:  1. Hospital discharge follow-up  -     CBC; Future  -     Basic Metabolic Panel; Future  2. Simple chronic bronchitis (HCC)  -     mometasone-formoterol (DULERA) 100-5 MCG/ACT inhaler; Inhale 2 puffs into the lungs 2 times daily, Disp-3 each, R-1Normal  -     albuterol sulfate HFA (VENTOLIN HFA) 108 (90 Base) MCG/ACT inhaler; Inhale 2 puffs into the lungs 4 times daily as needed for Wheezing, Disp-3 each, R-3Normal  3. Dyspnea on exertion  -     mometasone-formoterol (DULERA) 100-5 MCG/ACT inhaler; Inhale 2 puffs into the lungs 2 times daily, Disp-3 each, R-1Normal  -     albuterol sulfate HFA (VENTOLIN HFA) 108 (90 Base) MCG/ACT inhaler; Inhale 2 puffs into the lungs 4 times daily as needed for Wheezing, Disp-3 each, R-3Normal  -     [facility]  4. Chronic obstructive pulmonary disease, unspecified COPD type ([facility]  -     mometasone-formoterol (DULERA) 100-5 MCG/ACT inhaler; Inhale 2 puffs into the lungs 2 times daily, Disp-3 each, R-1Normal  -     albuterol sulfate HFA (VENTOLIN HFA) 108 (90 Base) MCG/ACT inhaler; Inhale 2 puffs into the lungs 4 times daily as needed for Wheezing, Disp-3 each, R-3Normal  5. Anemia, unspecified type  -     CBC; Future  -     Basic Metabolic Panel; Future  6. Lower extremity edema  -     [facility]  -     ECHO Complete 2D W Doppler W Color; Future  7. Closed displaced intertrochanteric fracture of right femur, initial encounter ([facility]  8. At high risk for falls    Patient advised that I would like her to follow-up with cardiology in regards to her blood pressure, lower extremity swelling, and hyponatremia. Patient encouraged to increase sodium through diet, repeat labs. Patient is to continue her home physical therapy as prescribed by discharging provider.  Due to increased shortness of breath with exertion and wheezing with exertion, I do feel it reasonable to start [facility] inhaler twice daily, as requested by daughter. Patient has no wheezing or crackles noted on auscultation of the lungs today, and due to sodium being borderline, I feel the risks outweigh the benefit of any Lasix therapy at this time. Patient did question whether she should continue aspirin. I advised at this time while she is still remains somewhat immobile and dependent on others to continue 81 mg aspirin daily. Return for 4-6 weeks hip fx. Subjective   SUBJECTIVE/OBJECTIVE:  Patient presents to the office today for follow-up after a displaced intertrochanteric fracture of the right hip and repair. Patient was cared for by [address] and had a ORIF completed by Dr. Pillo Samuel, on November 17, 2021. Patient did have some postacute anemia due to blood loss. She was later sent to [address] for rehab. Upon arrival patient did have some hyponatremia and low hemoglobin. She was discharged from [facility] on November 29, 2021, with home physical therapy for 3 weeks. Patient was advised prior to discharge to continue taking a prophylactic baby aspirin 81 mg twice daily 6 weeks postoperatively, to bear weight as tolerated, use ibuprofen for pain. Per note from [place] [address] care provider, Dr. Che Dutta, patient was advised to begin Effexor for postoperative neuropathy as well as tramadol as needed. Patient did continue to have some macrocytic anemia noted, and we will recheck this at today's visit. Daughter is questioning whether patient could tolerate some Lasix as she does have some mild swelling in her right lower extremity, as well as hyponatremia. However due to patient's blood pressure, and age, I am not confident in prescribing this routinely at this time. It is likely that the swelling is secondary to postoperative wound.  Patient does also have some dyspnea with exertion and underlying COPD. Daughter is asking if we can add additional steroid inhaler as well as albuterol. Patient remains independent at home, and does not want to have advanced testing done that she does not have easy access to. Daughter is also concerned about her cardiac function, and asking if there are any advanced tests that we can do. I do feel that due to the increased lower extremity swelling, an echocardiogram would be sufficient. Daughter is also requesting fecal occult blood test, due to her hemoglobin being low. Discussed with patient about getting fecal specimen or GI consult, and she declines at this time. Review of Systems   Constitutional: Positive for fatigue ( but states it is getting better). Negative for fever. Respiratory: Positive for chest tightness, shortness of breath and wheezing. Cough, sob, and wheezing noted only with exertion. Gastrointestinal: Negative for abdominal pain, constipation, diarrhea and nausea. Genitourinary: Negative for decreased urine volume, dysuria, enuresis and flank pain. Musculoskeletal: Positive for arthralgias and joint swelling ( hip lateral hip). Skin: Positive for wound ( post surgical wound right lateral hip). Neurological: Positive for weakness ( generalized. Increasing endurance since continuing therapy.) and light-headedness ( at times when going from sitting to standing). Psychiatric/Behavioral: Negative for agitation, confusion, dysphoric mood and sleep disturbance. The patient is not nervous/anxious. Objective   Physical Exam  Constitutional:       General: She is not in acute distress. Appearance: Normal appearance. She is normal weight. Cardiovascular:      Rate and Rhythm: Normal rate. Heart sounds: Normal heart sounds. No murmur heard. Pulmonary:      Effort: Pulmonary effort is normal. No tachypnea, accessory muscle usage, respiratory distress or retractions. Breath sounds: Normal breath sounds. No decreased air movement. No wheezing. Abdominal:      General: Abdomen is flat. Bowel sounds are normal. There is no distension. Palpations: Abdomen is soft. Tenderness: There is no abdominal tenderness. Musculoskeletal:         General: Swelling ( over rle.  mild) and tenderness ( mild, well controlled with motrin/tylenol) present. Right lower leg: Right lower leg edema:  mild. Left lower leg: No edema. Skin:     General: Skin is warm and dry. Capillary Refill: Capillary refill takes less than 2 seconds. Neurological:      Mental Status: She is alert and oriented to person, place, and time. Motor: Weakness ( generalized, however improving.) present. Psychiatric:         Behavior: Behavior normal.         Thought Content: Thought content normal.            On this date 12/13/2021 I have spent 65 minutes reviewing previous notes, test results and face to face with the patient discussing the diagnosis and importance of compliance with the treatment plan as well as documenting on the day of the visit. An electronic signature was used to authenticate this note.     --ALEIDA Rosas - CNP

## 2021-12-15 DIAGNOSIS — D50.9 IRON DEFICIENCY ANEMIA, UNSPECIFIED IRON DEFICIENCY ANEMIA TYPE: Primary | ICD-10-CM

## 2021-12-20 ENCOUNTER — TELEPHONE (OUTPATIENT)
Dept: FAMILY MEDICINE CLINIC | Age: 86
End: 2021-12-20

## 2021-12-20 NOTE — TELEPHONE ENCOUNTER
Clinical staff: please call daughter and see what we can help with. Looks like she is already scheduled for testing but may need HFU/EDFU     Pt's daughter Delmi Zee) states mom was in hospital   for fractured hip, area is swollen. Daughter would like to know if she   should get an [address]. Physical therapist said it looks \"ok. \" Please call   daughter [phone]

## 2021-12-20 NOTE — TELEPHONE ENCOUNTER
----- Message from Saroj Muñoz sent at 12/15/2021  4:21 PM EST -----  Subject: Message to Provider    QUESTIONS  Information for Provider? Pt's daughter called b/c the pt needs an echo. She was given a variety of options to where she could get this done but   would like this done at a cariologist that is associated with [facility] and   goes to [place]. Please give her daugther a call who is on HIPAA and will   likely be taking her. [address] phone number [phone].  ---------------------------------------------------------------------------  --------------  Lujean Lanes INFO  What is the best way for the office to contact you? OK to leave message on   voicemail  Preferred Call Back Phone Number? [phone]  ---------------------------------------------------------------------------  --------------  SCRIPT ANSWERS  Relationship to Patient? Third Party  Representative Name?  Aiyana Jin

## 2021-12-20 NOTE — TELEPHONE ENCOUNTER
Patient has had her HFU. Also spoke with patients daughter and let her know we do not have an open schedule for [facility] yet but we will give her a call to schedule once that schedule is opened.

## 2022-01-03 ENCOUNTER — HOSPITAL ENCOUNTER (OUTPATIENT)
Dept: NON INVASIVE DIAGNOSTICS | Age: 87
Discharge: HOME | End: 2022-01-05
Payer: MEDICARE

## 2022-01-03 DIAGNOSIS — R60.0 LOWER EXTREMITY EDEMA: ICD-10-CM

## 2022-01-03 LAB
LV EF: 53 %
LVEF MODALITY: NORMAL

## 2022-01-03 PROCEDURE — 93306 TTE W/DOPPLER COMPLETE: CPT

## 2022-01-04 ENCOUNTER — PATIENT MESSAGE (OUTPATIENT)
Dept: FAMILY MEDICINE CLINIC | Age: 87
End: 2022-01-04

## 2022-01-04 ASSESSMENT — ENCOUNTER SYMPTOMS
SHORTNESS OF BREATH: 1
DIARRHEA: 0
ABDOMINAL PAIN: 0
CHEST TIGHTNESS: 1
WHEEZING: 1
NAUSEA: 0
CONSTIPATION: 0

## 2022-01-04 NOTE — TELEPHONE ENCOUNTER
From: Alissa Ledesma  To:  Viral Vargas  Sent: 1/4/2022 9:57 AM EST  Subject: Echo results    Dear Carolina Keith,  Based on the Echo results, is there a need for follow up with a Cardiologist?

## 2022-01-25 ENCOUNTER — TELEPHONE (OUTPATIENT)
Dept: FAMILY MEDICINE CLINIC | Age: 87
End: 2022-01-25

## 2022-01-25 NOTE — TELEPHONE ENCOUNTER
Left detailed vm for daughter/and or patient letting them know she is able to go to any [facility] facility to get her labs drawn and that her labs will automatically be in her chart. I called the \"Preferred Call Back Phone Number? [phone]\".    ----- Message from Juana Patton sent at 1/25/2022  2:20 PM EST -----  Subject: Message to Provider    QUESTIONS  Information for Provider? Patient's daughter Delbert Hussein would like to know if   she could go to any [facility] lab for patient to get blood work done. I called   clinical and there was no answer. ---------------------------------------------------------------------------  --------------  Tatyana QUERSHI  What is the best way for the office to contact you? OK to leave message on   voicemail  Preferred Call Back Phone Number? [phone]  ---------------------------------------------------------------------------  --------------  SCRIPT ANSWERS  Relationship to Patient?  Third Party  Representative Name? Teresa Do

## 2022-01-27 ENCOUNTER — PATIENT MESSAGE (OUTPATIENT)
Dept: FAMILY MEDICINE CLINIC | Age: 87
End: 2022-01-27

## 2022-01-27 ENCOUNTER — TELEPHONE (OUTPATIENT)
Dept: FAMILY MEDICINE CLINIC | Age: 87
End: 2022-01-27

## 2022-01-27 DIAGNOSIS — R06.09 DYSPNEA ON EXERTION: ICD-10-CM

## 2022-01-27 DIAGNOSIS — J41.0 SIMPLE CHRONIC BRONCHITIS (HCC): Primary | ICD-10-CM

## 2022-01-27 DIAGNOSIS — J44.9 CHRONIC OBSTRUCTIVE PULMONARY DISEASE, UNSPECIFIED COPD TYPE (HCC): ICD-10-CM

## 2022-01-27 NOTE — TELEPHONE ENCOUNTER
Spoke with patient today about scheduling an appointment for cardio here in the [place] office. Patient stated that she is not ready to schedule and she will call us when she is ready.

## 2022-01-28 RX ORDER — FLUTICASONE PROPIONATE AND SALMETEROL 55; 14 UG/1; UG/1
1 POWDER, METERED RESPIRATORY (INHALATION) 2 TIMES DAILY
Qty: 1 EACH | Refills: 6 | Status: SHIPPED | OUTPATIENT
Start: 2022-01-28 | End: 2022-11-02 | Stop reason: SDUPTHER

## 2022-01-28 NOTE — TELEPHONE ENCOUNTER
From: Remy Crowe  To: Kush Slaughter  Sent: 1/27/2022 8:44 PM EST  Subject: Inhaler     Francheska,     The Dulera inhaler that you prescribed is still too costly, around $300/inhaler. You had mentioned at [facility] last appointment that either [facility] or Advair would help with her COPD symptoms. The generic Advair discus inhaler costs $80-90 depending on dose, using [facility] at [place], and shes willing to give it a try. Baby Purl costs even less, if thats an option. Whichever you decide on is fine. Shes been using the albuterol inhaler fairly regularly and feels it helps her breathe easier, so shed like to give a maintenance inhaler a try.    Thanks,  Luther Fields (daughter-in-law)

## 2022-02-07 ENCOUNTER — OFFICE VISIT (OUTPATIENT)
Dept: PRIMARY CARE CLINIC | Age: 87
End: 2022-02-07
Payer: MEDICARE

## 2022-02-07 ENCOUNTER — HOSPITAL ENCOUNTER (OUTPATIENT)
Age: 87
Setting detail: SPECIMEN
Discharge: HOME | End: 2022-02-07

## 2022-02-07 VITALS
TEMPERATURE: 97.7 F | DIASTOLIC BLOOD PRESSURE: 76 MMHG | OXYGEN SATURATION: 96 % | HEART RATE: 74 BPM | WEIGHT: 100 LBS | BODY MASS INDEX: 19.53 KG/M2 | SYSTOLIC BLOOD PRESSURE: 134 MMHG

## 2022-02-07 DIAGNOSIS — H61.23 BILATERAL IMPACTED CERUMEN: Primary | ICD-10-CM

## 2022-02-07 DIAGNOSIS — D50.9 IRON DEFICIENCY ANEMIA, UNSPECIFIED IRON DEFICIENCY ANEMIA TYPE: ICD-10-CM

## 2022-02-07 LAB
HCT VFR BLD CALC: 35.8 % (ref 36.3–47.1)
HEMOGLOBIN: 11.8 G/DL (ref 11.9–15.1)
IRON SATURATION: 55 % (ref 20–55)
IRON: 165 UG/DL (ref 37–145)
MCH RBC QN AUTO: 33.5 PG (ref 25.2–33.5)
MCHC RBC AUTO-ENTMCNC: 33 G/DL (ref 28.4–34.8)
MCV RBC AUTO: 101.7 FL (ref 82.6–102.9)
NRBC AUTOMATED: 0 PER 100 WBC
PDW BLD-RTO: 14.2 % (ref 11.8–14.4)
PLATELET # BLD: 300 K/UL (ref 138–453)
PMV BLD AUTO: 10.9 FL (ref 8.1–13.5)
RBC # BLD: 3.52 M/UL (ref 3.95–5.11)
TOTAL IRON BINDING CAPACITY: 301 UG/DL (ref 250–450)
UNSATURATED IRON BINDING CAPACITY: 136 UG/DL (ref 112–347)
WBC # BLD: 5.5 K/UL (ref 3.5–11.3)

## 2022-02-07 PROCEDURE — 99213 OFFICE O/P EST LOW 20 MIN: CPT | Performed by: PHYSICIAN ASSISTANT

## 2022-02-07 ASSESSMENT — ENCOUNTER SYMPTOMS
RHINORRHEA: 0
GASTROINTESTINAL NEGATIVE: 1
EYES NEGATIVE: 1
SINUS PRESSURE: 0
SINUS PAIN: 0
RESPIRATORY NEGATIVE: 1

## 2022-02-07 NOTE — PROGRESS NOTES
[place] [address] [place]  [address] [facility]  Phone: [phone]  Fax: Ronald Shahid    Pt Name: Goldie Luis  MRN: [id number]  Nandogfjose 2/5/1929  Date of evaluation: 2/7/2022  Provider: Nolber[address] Cro[address]       Chief Complaint   Patient presents with    Ear Fullness     Rt ear occluded with ear wax            HISTORY OF PRESENT ILLNESS  (Location/Symptom, Timing/Onset, Context/Setting, Quality, Duration, Modifying Factors, Severity.)   Goldie Luis is a 80 y.o. White (non-) [1] female who presents to the office for evaluation of      Ear Fullness   There is pain in the right ear. The current episode started in the past 7 days. The problem occurs constantly. There has been no fever. Associated symptoms include hearing loss. Pertinent negatives include no rhinorrhea. Nursing Notes were reviewed. REVIEW OF SYSTEMS    (2-9 systems for level 4, 10 or more for level 5)     Review of Systems   Constitutional: Negative for chills, diaphoresis, fatigue and fever. HENT: Positive for hearing loss. Negative for congestion, postnasal drip, rhinorrhea, sinus pressure, sinus pain and sneezing. Eyes: Negative. Respiratory: Negative. Cardiovascular: Negative. Gastrointestinal: Negative. Genitourinary: Negative. Musculoskeletal: Negative. Skin: Negative. Except as noted above the remainder of the review of systems was reviewed andnegative. PAST MEDICAL HISTORY   History reviewed. No past medical history on file. SURGICAL HISTORY     History reviewed.     Past Surgical History:   Procedure Laterality Date    TOTAL HIP ARTHROPLASTY Left          CURRENT MEDICATIONS       Current Outpatient Medications   Medication Sig Dispense Refill    Fluticasone-Salmeterol 55-14 MCG/ACT AEPB Inhale 1 Inhaler into the lungs 2 times daily 1 each 6    Calcium Carbonate-Vit D-Min (CALCIUM 1200) 1899-1593 MG-UNIT CHEW Take by mouth      Omega-3 Fatty Acids (FISH OIL) 1000 MG CAPS Take 3,000 mg by mouth 3 times daily      vitamin D (ERGOCALCIFEROL) 1.25 MG (03778 UT) CAPS capsule Take 50,000 Units by mouth once a week      aspirin 81 MG EC tablet Take 81 mg by mouth daily      acetaminophen (TYLENOL) 500 MG tablet Take 500 mg by mouth every 6 hours as needed for Pain      Biotin 1 MG CAPS Take by mouth      senna-docusate (SB DOCUSATE SODIUM/SENNA) 8.6-50 MG per tablet Take 1 tablet by mouth daily      albuterol sulfate HFA (VENTOLIN HFA) 108 (90 Base) MCG/ACT inhaler Inhale 2 puffs into the lungs 4 times daily as needed for Wheezing 3 each 3    mupirocin (BACTROBAN) 2 % ointment Apply topically 3 times daily. (Patient not taking: Reported on 12/13/2021) 1 each 2    meloxicam (MOBIC) 7.5 MG tablet Take 1 tablet by mouth daily (Patient not taking: Reported on 10/28/2021) 90 tablet 1    ibuprofen (ADVIL;MOTRIN) 800 MG tablet Take 1 tablet by mouth 2 times daily as needed for Pain (Patient not taking: Reported on 10/28/2021) 60 tablet 0     No current facility-administered medications for this visit. ALLERGIES     Seasonal    FAMILY HISTORY           Problem Relation Age of Onset    Diabetes Other      Family Status   Relation Name Status    Other  (Not Specified)          SOCIAL HISTORY      reports that she has quit smoking. Her smoking use included cigarettes. She has never used smokeless tobacco. She reports current alcohol use of about 4.0 standard drinks of alcohol per week. She reports that she does not use drugs. PHYSICAL EXAM    (up to 7 for level 4, 8 or more for level 5)     Vitals:    02/07/22 0932   BP: 134/76   Pulse: 74   Temp: 97.7 °F (36.5 °C)   SpO2: 96%   Weight: 100 lb (45.4 kg)         Physical Exam  Vitals and nursing note reviewed. Constitutional:       General: She is not in acute distress. Appearance: Normal appearance. She is not ill-appearing.    HENT:      Head: Normocephalic and atraumatic. Right Ear: External ear normal. No tenderness. No middle ear effusion. There is impacted cerumen. Tympanic membrane is not perforated or erythematous. Left Ear: External ear normal. No tenderness. No middle ear effusion. There is impacted cerumen. Tympanic membrane is not perforated or erythematous. Nose: Nose normal.      Mouth/Throat:      Mouth: Mucous membranes are moist.   Eyes:      Extraocular Movements: Extraocular movements intact. Conjunctiva/sclera: Conjunctivae normal.      Pupils: Pupils are equal, round, and reactive to light. Cardiovascular:      Rate and Rhythm: Normal rate. Pulmonary:      Effort: Pulmonary effort is normal.   Abdominal:      Palpations: Abdomen is soft. Skin:     General: Skin is warm and dry. Neurological:      Mental Status: She is alert and oriented to person, place, and time. DIFFERENTIAL DIAGNOSIS:       Pramod Reeder reviewed the disposition diagnosis with the patient and or their family/guardian. I have answered their questions and given discharge instructions. They voiced understanding of these instructions and did not have anyfurther questions or complaints. PROCEDURES:  No orders of the defined types were placed in this encounter. No results found for this visit on 02/07/22. Keyla Phillips     Return if symptoms worsen or fail to improve. DISCHARGEMEDICATIONS:  No orders of the defined types were placed in this encounter. Plan:  Ceruminosis is noted. Wax is removed by syringing and manual debridement. Instructions for home care to prevent wax buildup are given. Patient instructed to return to the office if symptoms worsen, return, or have any other concerns. Patient understands and is agreeable.          Dasha Conti PA-C 2/7/2022 7:06 PM

## 2022-02-15 ENCOUNTER — OFFICE VISIT (OUTPATIENT)
Dept: CARDIOLOGY CLINIC | Age: 87
End: 2022-02-15
Payer: MEDICARE

## 2022-02-15 VITALS — HEART RATE: 91 BPM | OXYGEN SATURATION: 97 % | BODY MASS INDEX: 19.53 KG/M2 | WEIGHT: 100 LBS

## 2022-02-15 DIAGNOSIS — R06.00 DYSPNEA, UNSPECIFIED TYPE: Primary | ICD-10-CM

## 2022-02-15 PROCEDURE — 99202 OFFICE O/P NEW SF 15 MIN: CPT | Performed by: NURSE PRACTITIONER

## 2022-02-15 RX ORDER — FUROSEMIDE 20 MG/1
20 TABLET ORAL DAILY PRN
Qty: 60 TABLET | Refills: 3 | Status: SHIPPED
Start: 2022-02-15 | End: 2022-11-02

## 2022-02-15 NOTE — PROGRESS NOTES
Cardiology Consultation  [facility]      02/15/22      CC:  Patient is here for Consultation     HPI:  Lenita Peabody has recently been complaining of dyspnea with exertion. She states that it has improved since starting on an inhaler. She states that she has had LE swelling in the past, but none currently. She wears compression stockings daily. No chest pain. She denies any cardiac hx. She is refusing a EKG today and states that her BP is always elevated in the office. She is adamant that she has white coat syndrome. She states that she has checked her BP in the past at home. She is declining BP medications at this time. No past medical history on file. Past Surgical History:   Procedure Laterality Date    TOTAL HIP ARTHROPLASTY Left        Family History   Problem Relation Age of Onset    Diabetes Other        Social History     Socioeconomic History    Marital status:      Spouse name: Not on file    Number of children: Not on file    Years of education: Not on file    Highest education level: Not on file   Occupational History    Not on file   Tobacco Use    Smoking status: Former Smoker     Types: Cigarettes    Smokeless tobacco: Never Used   Substance and Sexual Activity    Alcohol use: Yes     Alcohol/week: 4.0 standard drinks     Types: 4 Standard drinks or equivalent per week    Drug use: Never    Sexual activity: Not Currently   Other Topics Concern    Not on file   Social History Narrative    Not on file     Social Determinants of Health     Financial Resource Strain:     Difficulty of Paying Living Expenses: Not on file   Food Insecurity:     Worried About Running Out of Food in the Last Year: Not on file    Maggie of Food in the Last Year: Not on file   Transportation Needs:     Lack of Transportation (Medical): Not on file    Lack of Transportation (Non-Medical):  Not on file   Physical Activity:     Days of Exercise per Week: Not on file    Minutes of Exercise per Session: Not on file   Stress:     Feeling of Stress : Not on file   Social Connections:     Frequency of Communication with Friends and Family: Not on file    Frequency of Social Gatherings with Friends and Family: Not on file    Attends Druze Services: Not on file    Active Member of [address] [place] [address] or Organizations: Not on file    Attends Club or Organization Meetings: Not on file    Marital Status: Not on file   Intimate Partner Violence:     Fear of Current or Ex-Partner: Not on file    Emotionally Abused: Not on file    Physically Abused: Not on file    Sexually Abused: Not on file   Housing Stability:     Unable to Pay for Housing in the Last Year: Not on file    Number of [place] in the Last Year: Not on file    Unstable Housing in the Last Year: Not on file        Allergies   Allergen Reactions    Seasonal Other (See Comments)       Current Outpatient Medications   Medication Sig Dispense Refill    Fluticasone-Salmeterol 55-14 MCG/ACT AEPB Inhale 1 Inhaler into the lungs 2 times daily 1 each 6    Calcium Carbonate-Vit D-Min (CALCIUM 1200) 0811-2413 MG-UNIT CHEW Take by mouth      Omega-3 Fatty Acids (FISH OIL) 1000 MG CAPS Take 3,000 mg by mouth 3 times daily      vitamin D (ERGOCALCIFEROL) 1.25 MG (86602 UT) CAPS capsule Take 50,000 Units by mouth once a week      aspirin 81 MG EC tablet Take 81 mg by mouth daily      acetaminophen (TYLENOL) 500 MG tablet Take 500 mg by mouth every 6 hours as needed for Pain      Biotin 1 MG CAPS Take by mouth      senna-docusate (SB DOCUSATE SODIUM/SENNA) 8.6-50 MG per tablet Take 1 tablet by mouth daily      albuterol sulfate HFA (VENTOLIN HFA) 108 (90 Base) MCG/ACT inhaler Inhale 2 puffs into the lungs 4 times daily as needed for Wheezing 3 each 3    mupirocin (BACTROBAN) 2 % ointment Apply topically 3 times daily.  (Patient not taking: Reported on 12/13/2021) 1 each 2    meloxicam (MOBIC) 7.5 MG tablet Take 1 tablet by gallop  Abdomen: soft, non-tender; bowel sounds normal; no masses,  no organomegaly  Extremities: extremities normal, atraumatic, no cyanosis or edema, using walker today   Neurologic: Mental status: Alert, oriented, thought content appropriate  Skin: WNL for age and condition, no obvious rashes or leasions      EKG:  Pt refused     LAST ECHO: 1/3/22  Summary  Normal left ventricular size with normal function, estimated LVEF 50-55%. Mild concentric left ventricular hypertrophy. Grade I (mild) left ventricular diastolic dysfunction. Left atrium is moderately dilated. Right atrium is mildly dilated . Mild to moderate aortic insufficiency. Mild mitral regurgitation. LAST STRESS: none     LAST CATH: None       Past Medical and Surgical History, Problem List, Allergies, Medications, Labs, Imaging, all reviewed extensively in EMR and with the patient. Assessment and Plan:    1. Dyspnea with exertion. Diastolic HF on echo. No signs of fluid overload today. Will order lasix 20mg daily PRN for swelling, sob or weight gain. Educated pt on lasix. Pt verbalizes understanding. 2. HTN- Likely Chronic. BP rechecked 160/82. Pt refusing BP medications at this time. She insist she has white coat syndrome. Discussed importance of BP control. Advised to monitor BP at home and call if elevated    3. Pt declined EKG at this time. 4. Will follow up in 6 months or sooner as needed       Thank you for allowing me to participate in the care of this patient, please do not hesitate to call if you have any question  Lilile Chino, APRN - [phone] [facility]  [facility]  [phone]

## 2022-05-19 ENCOUNTER — NURSE ONLY (OUTPATIENT)
Dept: FAMILY MEDICINE CLINIC | Age: 87
End: 2022-05-19

## 2022-05-19 VITALS — DIASTOLIC BLOOD PRESSURE: 70 MMHG | SYSTOLIC BLOOD PRESSURE: 112 MMHG

## 2022-05-19 DIAGNOSIS — Z01.30 BP CHECK: Primary | ICD-10-CM

## 2022-05-19 NOTE — PROGRESS NOTES
Patient did not need ears cleaned today. Her and her daughter just wanted to make sure they were clean. BP is good at 112/70.

## 2022-06-27 ENCOUNTER — TELEPHONE (OUTPATIENT)
Dept: FAMILY MEDICINE CLINIC | Age: 87
End: 2022-06-27

## 2022-06-27 DIAGNOSIS — M48.062 SPINAL STENOSIS OF LUMBAR REGION WITH NEUROGENIC CLAUDICATION: Primary | ICD-10-CM

## 2022-06-27 DIAGNOSIS — G89.29 CHRONIC BILATERAL LOW BACK PAIN, UNSPECIFIED WHETHER SCIATICA PRESENT: ICD-10-CM

## 2022-06-27 DIAGNOSIS — M54.50 CHRONIC BILATERAL LOW BACK PAIN, UNSPECIFIED WHETHER SCIATICA PRESENT: ICD-10-CM

## 2022-06-27 NOTE — TELEPHONE ENCOUNTER
----- Message from Abbe Jones sent at 2022  2:24 PM EDT -----  Subject: Referral Request    QUESTIONS   Reason for referral request? Pt daughter calling to ask for referral for   mother to continue to receive physical therapy while she is out of town   visiting daughter. Please fax to? [phone], please make sure her name,   , and diagnosis on file in fax. Has the physician seen you for this condition before? Yes  Select a date? 2021  Select the Provider the patient wants to be referred to, if known (PCP or   Specialist)? Outside Physician - Physical Therapy? [facility] in    [facility]   Preferred Specialist (if applicable)? Do you already have an appointment scheduled? No  Additional Information for Provider? Provider ? Hallie Arriaga   ---------------------------------------------------------------------------  --------------  Daryle Havejose alfredo INFO  What is the best way for the office to contact you? OK to leave message on   voicemail  Preferred Call Back Phone Number? [phone]  ---------------------------------------------------------------------------  --------------  SCRIPT ANSWERS  Relationship to Patient? Other  Representative Name? Renuka Cisneros  Is the Representative on the appropriate HIPAA document in Epic?  Yes

## 2022-06-29 NOTE — TELEPHONE ENCOUNTER
Pt daughter is insisting on the referral she says shes done this 3x before and it was never an issue with being out of state.    Physical rehabilitation  [place]   [phone] is where she wants it faxed to

## 2022-09-23 ENCOUNTER — TELEPHONE (OUTPATIENT)
Dept: FAMILY MEDICINE CLINIC | Age: 87
End: 2022-09-23

## 2022-09-23 NOTE — TELEPHONE ENCOUNTER
Pt called into the office stating that she was seen by her surgeon the other day and they mentioned her BP was elevated. Pt wants to know if OK to come in for a nurse visit BP check? Or should this be OV?  There were only VV available

## 2022-09-23 NOTE — TELEPHONE ENCOUNTER
Patient can come in for nurse visit to check. If BP is elevated, patient will need to schedule a same day visit.

## 2022-09-28 ENCOUNTER — NURSE ONLY (OUTPATIENT)
Dept: PRIMARY CARE CLINIC | Age: 87
End: 2022-09-28

## 2022-09-28 VITALS
HEART RATE: 64 BPM | DIASTOLIC BLOOD PRESSURE: 82 MMHG | SYSTOLIC BLOOD PRESSURE: 160 MMHG | OXYGEN SATURATION: 98 % | TEMPERATURE: 98 F

## 2022-09-28 DIAGNOSIS — Z01.30 BLOOD PRESSURE CHECK: Primary | ICD-10-CM

## 2022-09-28 NOTE — PROGRESS NOTES
Per Sergey Landry, she needs to be documenting her bp daily, needs an appt with Francheska sooner than 11/2, watch sodium intake. If she has high BP at home and it is accociated with sob, chest pain, nausea, or vision changes- she needs to go to the ER.

## 2022-10-05 ENCOUNTER — OFFICE VISIT (OUTPATIENT)
Dept: FAMILY MEDICINE CLINIC | Age: 87
End: 2022-10-05
Payer: MEDICARE

## 2022-10-05 VITALS
BODY MASS INDEX: 19.79 KG/M2 | SYSTOLIC BLOOD PRESSURE: 134 MMHG | HEIGHT: 60 IN | DIASTOLIC BLOOD PRESSURE: 74 MMHG | HEART RATE: 80 BPM | WEIGHT: 100.8 LBS | OXYGEN SATURATION: 98 %

## 2022-10-05 DIAGNOSIS — I10 PRIMARY HYPERTENSION: Primary | ICD-10-CM

## 2022-10-05 DIAGNOSIS — I50.1 LEFT-SIDED HEART FAILURE (HCC): ICD-10-CM

## 2022-10-05 PROCEDURE — 99214 OFFICE O/P EST MOD 30 MIN: CPT

## 2022-10-05 PROCEDURE — 1123F ACP DISCUSS/DSCN MKR DOCD: CPT

## 2022-10-05 RX ORDER — LISINOPRIL AND HYDROCHLOROTHIAZIDE 12.5; 1 MG/1; MG/1
1 TABLET ORAL DAILY
Qty: 90 TABLET | Refills: 0 | Status: SHIPPED | OUTPATIENT
Start: 2022-10-05 | End: 2023-01-03

## 2022-10-05 ASSESSMENT — PATIENT HEALTH QUESTIONNAIRE - PHQ9
SUM OF ALL RESPONSES TO PHQ QUESTIONS 1-9: 0
SUM OF ALL RESPONSES TO PHQ9 QUESTIONS 1 & 2: 0
2. FEELING DOWN, DEPRESSED OR HOPELESS: 0
1. LITTLE INTEREST OR PLEASURE IN DOING THINGS: 0
SUM OF ALL RESPONSES TO PHQ QUESTIONS 1-9: 0

## 2022-10-05 ASSESSMENT — ENCOUNTER SYMPTOMS
BACK PAIN: 1
NAUSEA: 0
COUGH: 0
CONSTIPATION: 0
SORE THROAT: 0
EYE DISCHARGE: 0
DIARRHEA: 0
SHORTNESS OF BREATH: 0
VOMITING: 0

## 2022-10-05 NOTE — PROGRESS NOTES
Tony Hernandez (:  1929) is a 80 y.o. female,Established patient, here for evaluation of the following chief complaint(s):  Hypertension         ASSESSMENT/PLAN:  1. Primary hypertension  -     lisinopril-hydroCHLOROthiazide (PRINZIDE;ZESTORETIC) 10-12.5 MG per tablet; Take 1 tablet by mouth daily, Disp-90 tablet, R-0Normal  2. Left-sided heart failure (HCC)  -     lisinopril-hydroCHLOROthiazide (PRINZIDE;ZESTORETIC) 10-12.5 MG per tablet; Take 1 tablet by mouth daily, Disp-90 tablet, R-0Normal    Patient declining follow up with cardiology or EKG at this time. Would like to monitor BP at home and follow up at her AWV. Advised to call with any side effects of medication. Return for please schedule follow up with cardiology . Subjective   SUBJECTIVE/OBJECTIVE:  Patient presents today for follow-up of high blood pressure. Patient had a procedure done at her pain management office, and was noted to have blood pressure in the 872B and 846Q systolically. Patient then had a lab MA visit in the office, in which her blood pressure was 160/82. Patient has been keeping her blood pressure monitored and documented over the last week, and consistently is having blood pressures in the 150s, 160s, 170s. Patient's lowest documented blood pressure was 130/82, and her blood pressure today is 134/74. I do feel that given patient's blood pressure readings at home along with documented blood pressure read readings in the office that patient could benefit from antihypertensive medication. Patient was previously started on Lasix by cardiology for left-sided heart failure on previous echo. Patient has since electively stopped that, as she states she is no longer having lower extremity edema, and is wearing her compression hose daily. Patient denies any shortness of breath, chest pain, or palpitations. Review of Systems   Constitutional:  Negative for activity change, fatigue and fever.    HENT:  Negative for dental problem and sore throat. Eyes:  Negative for discharge and visual disturbance. Respiratory:  Negative for cough and shortness of breath. Cardiovascular:  Negative for chest pain, palpitations and leg swelling. Gastrointestinal:  Negative for constipation, diarrhea, nausea and vomiting. Genitourinary:  Negative for difficulty urinating and dysuria. Musculoskeletal:  Positive for back pain (chronic- following with Dr Pari Munoz). Negative for arthralgias and myalgias. Skin:  Negative for rash and wound. Allergic/Immunologic: Negative for environmental allergies. Neurological:  Negative for headaches. Hematological:  Does not bruise/bleed easily. Psychiatric/Behavioral:  Negative for agitation and sleep disturbance. Objective   Physical Exam  Vitals reviewed. Constitutional:       General: She is not in acute distress. Appearance: Normal appearance. She is not ill-appearing, toxic-appearing or diaphoretic. Cardiovascular:      Rate and Rhythm: Normal rate and regular rhythm. Heart sounds: Normal heart sounds. No murmur heard. Pulmonary:      Effort: Pulmonary effort is normal. No respiratory distress. Breath sounds: Normal breath sounds. Musculoskeletal:      Comments: Walking with walker   Skin:     General: Skin is warm and dry. Neurological:      Mental Status: She is alert and oriented to person, place, and time. Motor: No weakness. On this date 10/5/2022 I have spent 20 minutes reviewing previous notes, test results and face to face with the patient discussing the diagnosis and importance of compliance with the treatment plan as well as documenting on the day of the visit. An electronic signature was used to authenticate this note.     --ALEIDA Baldwin - CNP

## 2022-11-02 ENCOUNTER — OFFICE VISIT (OUTPATIENT)
Dept: FAMILY MEDICINE CLINIC | Age: 87
End: 2022-11-02
Payer: MEDICARE

## 2022-11-02 VITALS
OXYGEN SATURATION: 97 % | HEIGHT: 60 IN | WEIGHT: 98.2 LBS | SYSTOLIC BLOOD PRESSURE: 122 MMHG | HEART RATE: 70 BPM | DIASTOLIC BLOOD PRESSURE: 76 MMHG | BODY MASS INDEX: 19.28 KG/M2

## 2022-11-02 DIAGNOSIS — Z00.00 MEDICARE ANNUAL WELLNESS VISIT, SUBSEQUENT: Primary | ICD-10-CM

## 2022-11-02 DIAGNOSIS — J44.9 CHRONIC OBSTRUCTIVE PULMONARY DISEASE, UNSPECIFIED COPD TYPE (HCC): ICD-10-CM

## 2022-11-02 DIAGNOSIS — I10 PRIMARY HYPERTENSION: ICD-10-CM

## 2022-11-02 DIAGNOSIS — D64.9 ANEMIA, UNSPECIFIED TYPE: ICD-10-CM

## 2022-11-02 DIAGNOSIS — Z71.89 HEARING AID CONSULTATION: ICD-10-CM

## 2022-11-02 DIAGNOSIS — J41.0 SIMPLE CHRONIC BRONCHITIS (HCC): ICD-10-CM

## 2022-11-02 DIAGNOSIS — R06.09 DYSPNEA ON EXERTION: ICD-10-CM

## 2022-11-02 PROCEDURE — 1123F ACP DISCUSS/DSCN MKR DOCD: CPT

## 2022-11-02 PROCEDURE — G0439 PPPS, SUBSEQ VISIT: HCPCS

## 2022-11-02 RX ORDER — FLUTICASONE PROPIONATE AND SALMETEROL 55; 14 UG/1; UG/1
1 POWDER, METERED RESPIRATORY (INHALATION) 2 TIMES DAILY
Qty: 1 EACH | Refills: 6 | Status: SHIPPED | OUTPATIENT
Start: 2022-11-02

## 2022-11-02 ASSESSMENT — LIFESTYLE VARIABLES
HOW OFTEN DURING THE LAST YEAR HAVE YOU FOUND THAT YOU WERE NOT ABLE TO STOP DRINKING ONCE YOU HAD STARTED: 0
HAS A RELATIVE, FRIEND, DOCTOR, OR ANOTHER HEALTH PROFESSIONAL EXPRESSED CONCERN ABOUT YOUR DRINKING OR SUGGESTED YOU CUT DOWN: 0
HOW OFTEN DURING THE LAST YEAR HAVE YOU HAD A FEELING OF GUILT OR REMORSE AFTER DRINKING: 0
HOW OFTEN DO YOU HAVE A DRINK CONTAINING ALCOHOL: 4 OR MORE TIMES A WEEK
HOW MANY STANDARD DRINKS CONTAINING ALCOHOL DO YOU HAVE ON A TYPICAL DAY: 1 OR 2
HAVE YOU OR SOMEONE ELSE BEEN INJURED AS A RESULT OF YOUR DRINKING: 0
HOW OFTEN DURING THE LAST YEAR HAVE YOU FAILED TO DO WHAT WAS NORMALLY EXPECTED FROM YOU BECAUSE OF DRINKING: 0
HOW OFTEN DURING THE LAST YEAR HAVE YOU BEEN UNABLE TO REMEMBER WHAT HAPPENED THE NIGHT BEFORE BECAUSE YOU HAD BEEN DRINKING: 0
HOW OFTEN DURING THE LAST YEAR HAVE YOU NEEDED AN ALCOHOLIC DRINK FIRST THING IN THE MORNING TO GET YOURSELF GOING AFTER A NIGHT OF HEAVY DRINKING: 0

## 2022-11-02 ASSESSMENT — PATIENT HEALTH QUESTIONNAIRE - PHQ9
2. FEELING DOWN, DEPRESSED OR HOPELESS: 0
SUM OF ALL RESPONSES TO PHQ QUESTIONS 1-9: 0
SUM OF ALL RESPONSES TO PHQ QUESTIONS 1-9: 0
1. LITTLE INTEREST OR PLEASURE IN DOING THINGS: 0
SUM OF ALL RESPONSES TO PHQ QUESTIONS 1-9: 0
SUM OF ALL RESPONSES TO PHQ9 QUESTIONS 1 & 2: 0
SUM OF ALL RESPONSES TO PHQ QUESTIONS 1-9: 0

## 2022-11-02 NOTE — PATIENT INSTRUCTIONS
Personalized Preventive Plan for Severiano Fee - 11/2/2022  Medicare offers a range of preventive health benefits. Some of the tests and screenings are paid in full while other may be subject to a deductible, co-insurance, and/or copay. Some of these benefits include a comprehensive review of your medical history including lifestyle, illnesses that may run in your family, and various assessments and screenings as appropriate. After reviewing your medical record and screening and assessments performed today your provider may have ordered immunizations, labs, imaging, and/or referrals for you. A list of these orders (if applicable) as well as your Preventive Care list are included within your After Visit Summary for your review. Other Preventive Recommendations:    A preventive eye exam performed by an eye specialist is recommended every 1-2 years to screen for glaucoma; cataracts, macular degeneration, and other eye disorders. A preventive dental visit is recommended every 6 months. Try to get at least 150 minutes of exercise per week or 10,000 steps per day on a pedometer . Order or download the FREE \"Exercise & Physical Activity: Your Everyday Guide\" from [employer]. Call 5-386.263.5235 or search [employer] online. You need 7962-0838 mg of calcium and 3775-7678 IU of vitamin D per day. It is possible to meet your calcium requirement with diet alone, but a vitamin D supplement is usually necessary to meet this goal.  When exposed to the sun, use a sunscreen that protects against both UVA and UVB radiation with an SPF of 30 or greater. Reapply every 2 to 3 hours or after sweating, drying off with a towel, or swimming. Always wear a seat belt when traveling in a car. Always wear a helmet when riding a bicycle or motorcycle.

## 2022-11-02 NOTE — PROGRESS NOTES
Medicare Annual Wellness Visit    Sherrell Pastor is here for Medicare AWV    Assessment & Plan   Medicare annual wellness visit, subsequent  Hearing aid consultation  -     [facility] - Romi Galindo AUD, Audiology, [place]  Simple chronic bronchitis ([address]  -     Fluticasone-Salmeterol 55-14 MCG/ACT AEPB; Inhale 1 Inhaler into the lungs 2 times daily, Disp-1 each, R-6Normal  Dyspnea on exertion  -     Fluticasone-Salmeterol 55-14 MCG/ACT AEPB; Inhale 1 Inhaler into the lungs 2 times daily, Disp-1 each, R-6Normal  Chronic obstructive pulmonary disease, unspecified COPD type ([address]  -     Fluticasone-Salmeterol 55-14 MCG/ACT AEPB; Inhale 1 Inhaler into the lungs 2 times daily, Disp-1 each, R-6Normal  Anemia, unspecified type  -     CBC with Auto Differential; Future  Primary hypertension  -     Basic Metabolic Panel; Future    Recommendations for Preventive Services Due: see orders and patient instructions/AVS.  Recommended screening schedule for the next 5-10 years is provided to the patient in written form: see Patient Instructions/AVS.     Return in 6 months (on 5/2/2023) for HTN- 6m and MAWV in 1 year. Subjective   The following acute and/or chronic problems were also addressed today:  Patient presents with her friend today for her AWV. She is doing well and her BP well managed, as she was recently started on prinzide for her BP. She does have intermittent scant lower extremity edema. She quit taking Lasix electively because she does not want to take it anymore. She has not noticed a significant increase in her in lower extremity edema since quitting. She does try to wear compression stockings. Patient has been using her inhaler and denies any increase in SOB. Patient's complete Health Risk Assessment and screening values have been reviewed and are found in Flowsheets. The following problems were reviewed today and where indicated follow up appointments were made and/or referrals ordered.     Positive Risk Factor Screenings with Interventions:    Fall Risk:  Do you feel unsteady or are you worried about falling? : (!) yes  2 or more falls in past year?: no  Fall with injury in past year?: (!) yes   Fall Risk Interventions:    Home safety tips provided  Continued PT weekly and home exercises            General Health and ACP:  General  In general, how would you say your health is?: Very Good  In the past 7 days, have you experienced any of the following: New or Increased Pain, New or Increased Fatigue, Loneliness, Social Isolation, Stress or Anger?: No  Do you get the social and emotional support that you need?: Yes  Do you have a Living Will?: Yes    Advance Directives       Power of  Living Will ACP-Advance Directive ACP-Power of Harris Duty on 11/09/21 Not on File Not on File Josiah Kc Risk Interventions:  None      Hearing/Vision:  Do you or your family notice any trouble with your hearing that hasn't been managed with hearing aids?: (!) Yes  Do you have difficulty driving, watching TV, or doing any of your daily activities because of your eyesight?: (!) Yes  Have you had an eye exam within the past year?: Yes  No results found.   Hearing/Vision Interventions:  Hearing concerns:  audiology referral provided    Safety:  Do you have working smoke detectors?: Yes  Do you have any tripping hazards - loose or unsecured carpets or rugs?: No  Do you have any tripping hazards - clutter in doorways, halls, or stairs?: No  Do you have either shower bars, grab bars, non-slip mats or non-slip surfaces in your shower or bathtub?: (!) No  Do all of your stairways have a railing or banister?: Not Applicable  Do you always fasten your seatbelt when you are in a car?: Yes  Safety Interventions:  Home safety tips provided  Continue PT            Objective   Vitals:    11/02/22 1402   BP: 122/76   Site: Right Upper Arm   Position: Sitting   Cuff Size: Small Adult   Pulse: 70   SpO2: 97%   Weight: 98 lb 3.2 oz (44.5 kg)   Height: 5' (1.524 m)      Body mass index is 19.18 kg/m². Allergies   Allergen Reactions    Seasonal Other (See Comments)     Prior to Visit Medications    Medication Sig Taking? Authorizing Provider   Fluticasone-Salmeterol 55-14 MCG/ACT AEPB Inhale 1 Inhaler into the lungs 2 times daily Yes ALEIDA Robertson CNP   lisinopril-hydroCHLOROthiazide (PRINZIDE;ZESTORETIC) 10-12.5 MG per tablet Take 1 tablet by mouth daily Yes ALEIDA Barr CNP   Calcium Carbonate-Vit D-Min (CALCIUM 1200) 4811-8140 MG-UNIT CHEW Take by mouth Yes Historical Provider, MD   Omega-3 Fatty Acids (FISH OIL) 1000 MG CAPS Take 3,000 mg by mouth 3 times daily Yes Historical Provider, MD   Biotin 1 MG CAPS Take by mouth Yes Historical Provider, MD   senna-docusate (Mary Adolfo) 8.6-50 MG per tablet Take 1 tablet by mouth daily Yes Historical Provider, MD   albuterol sulfate HFA (VENTOLIN HFA) 108 (90 Base) MCG/ACT inhaler Inhale 2 puffs into the lungs 4 times daily as needed for Wheezing  Patient taking differently: Inhale 2 puffs into the lungs every 4 hours as needed for Wheezing Yes ALEIDA Barr CNP   ibuprofen (ADVIL;MOTRIN) 800 MG tablet Take 1 tablet by mouth 2 times daily as needed for Pain Yes John Pearson MD   mupirocin (BACTROBAN) 2 % ointment Apply topically 3 times daily.   Patient not taking: No sig reported  ALEIDA Barr CNP       CareTeam (Including outside providers/suppliers regularly involved in providing care):   Patient Care Team:  ALEIDA Barr CNP as PCP - General (Family Nurse Practitioner)  ALEIDA Barr CNP as PCP - [facility] Empaneled Provider     Reviewed and updated this visit:  Tobacco  Allergies  Meds  Problems  Med Hx  Surg Hx  Soc Hx  Fam Hx

## 2022-11-08 ENCOUNTER — HOSPITAL ENCOUNTER (OUTPATIENT)
Age: 87
Setting detail: SPECIMEN
Discharge: HOME | End: 2022-11-08

## 2022-11-08 DIAGNOSIS — D64.9 ANEMIA, UNSPECIFIED TYPE: ICD-10-CM

## 2022-11-08 DIAGNOSIS — I10 PRIMARY HYPERTENSION: ICD-10-CM

## 2022-11-08 LAB
ABSOLUTE EOS #: 0.22 K/UL (ref 0–0.44)
ABSOLUTE IMMATURE GRANULOCYTE: <0.03 K/UL (ref 0–0.3)
ABSOLUTE LYMPH #: 1.63 K/UL (ref 1.1–3.7)
ABSOLUTE MONO #: 0.88 K/UL (ref 0.1–1.2)
BASOPHILS # BLD: 1 % (ref 0–2)
BASOPHILS ABSOLUTE: 0.09 K/UL (ref 0–0.2)
EOSINOPHILS RELATIVE PERCENT: 3 % (ref 1–4)
HCT VFR BLD CALC: 34 % (ref 36.3–47.1)
HEMOGLOBIN: 11.3 G/DL (ref 11.9–15.1)
IMMATURE GRANULOCYTES: 0 %
LYMPHOCYTES # BLD: 22 % (ref 24–43)
MCH RBC QN AUTO: 33 PG (ref 25.2–33.5)
MCHC RBC AUTO-ENTMCNC: 33.2 G/DL (ref 28.4–34.8)
MCV RBC AUTO: 99.4 FL (ref 82.6–102.9)
MONOCYTES # BLD: 12 % (ref 3–12)
NRBC AUTOMATED: 0 PER 100 WBC
PDW BLD-RTO: 13.3 % (ref 11.8–14.4)
PLATELET # BLD: 324 K/UL (ref 138–453)
PMV BLD AUTO: 10.4 FL (ref 8.1–13.5)
RBC # BLD: 3.42 M/UL (ref 3.95–5.11)
SEG NEUTROPHILS: 62 % (ref 36–65)
SEGMENTED NEUTROPHILS ABSOLUTE COUNT: 4.64 K/UL (ref 1.5–8.1)
WBC # BLD: 7.5 K/UL (ref 3.5–11.3)

## 2022-11-09 LAB
ANION GAP SERPL CALCULATED.3IONS-SCNC: 15 MMOL/L (ref 9–17)
BUN BLDV-MCNC: 33 MG/DL (ref 8–23)
CALCIUM SERPL-MCNC: 9.5 MG/DL (ref 8.6–10.4)
CHLORIDE BLD-SCNC: 93 MMOL/L (ref 98–107)
CO2: 21 MMOL/L (ref 20–31)
CREAT SERPL-MCNC: 0.68 MG/DL (ref 0.5–0.9)
GFR SERPL CREATININE-BSD FRML MDRD: >60 ML/MIN/1.73M2
GLUCOSE BLD-MCNC: 97 MG/DL (ref 70–99)
POTASSIUM SERPL-SCNC: 5.5 MMOL/L (ref 3.7–5.3)
SODIUM BLD-SCNC: 129 MMOL/L (ref 135–144)

## 2022-11-14 DIAGNOSIS — I50.1 LEFT-SIDED HEART FAILURE (HCC): Primary | ICD-10-CM

## 2022-11-14 DIAGNOSIS — E87.5 HYPERKALEMIA: ICD-10-CM

## 2022-11-14 DIAGNOSIS — E87.1 CHRONIC HYPONATREMIA: ICD-10-CM

## 2022-11-29 ENCOUNTER — HOSPITAL ENCOUNTER (OUTPATIENT)
Age: 87
Setting detail: SPECIMEN
Discharge: HOME | End: 2022-11-29

## 2022-11-29 DIAGNOSIS — E87.5 HYPERKALEMIA: ICD-10-CM

## 2022-11-29 DIAGNOSIS — E87.1 CHRONIC HYPONATREMIA: ICD-10-CM

## 2022-11-29 DIAGNOSIS — I50.1 LEFT-SIDED HEART FAILURE (HCC): ICD-10-CM

## 2022-11-30 LAB
ANION GAP SERPL CALCULATED.3IONS-SCNC: 14 MMOL/L (ref 9–17)
BUN BLDV-MCNC: 34 MG/DL (ref 8–23)
CALCIUM SERPL-MCNC: 9.3 MG/DL (ref 8.6–10.4)
CHLORIDE BLD-SCNC: 99 MMOL/L (ref 98–107)
CO2: 23 MMOL/L (ref 20–31)
CREAT SERPL-MCNC: 0.76 MG/DL (ref 0.5–0.9)
GFR SERPL CREATININE-BSD FRML MDRD: >60 ML/MIN/1.73M2
GLUCOSE BLD-MCNC: 89 MG/DL (ref 70–99)
POTASSIUM SERPL-SCNC: 5.1 MMOL/L (ref 3.7–5.3)
SODIUM BLD-SCNC: 136 MMOL/L (ref 135–144)

## 2022-12-19 NOTE — TELEPHONE ENCOUNTER
LOV: 11/2/22  LRF: 11/24/21   NA: 5/18/23  Health Maintenance   Topic Date Due    Depression Screen  11/02/2023    Annual Wellness Visit (AWV)  11/03/2023    DTaP/Tdap/Td vaccine (3 - Td or Tdap) 10/22/2030    Flu vaccine  Completed    Shingles vaccine  Completed    Pneumococcal 65+ years Vaccine  Completed    COVID-19 Vaccine  Completed    Hepatitis A vaccine  Aged Out    Hib vaccine  Aged Out    Meningococcal (ACWY) vaccine  Aged Out             (applicable per patient's age: Cancer Screenings, Depression Screening, Fall Risk Screening, Immunizations)    Hemoglobin A1C (%)   Date Value   01/08/2017 5.4     LDL Cholesterol (mg/dL)   Date Value   10/29/2021 95     AST (U/L)   Date Value   10/29/2021 29     ALT (U/L)   Date Value   10/29/2021 16     BUN (mg/dL)   Date Value   11/29/2022 34 (H)      (goal A1C is < 7)   (goal LDL is <100) need 30-50% reduction from baseline     BP Readings from Last 3 Encounters:   11/02/22 122/76   10/05/22 134/74   09/28/22 (!) 160/82    (goal /80)      All Future Testing planned in [facility]:      Next Visit Date:  Future Appointments   Date Time Provider Srikanth Pineda   5/18/2023  2:30 PM ALEIDA Santamaria CNP [facility]            Patient Active Problem List:     Closed left hip fracture ([address]     At high risk for falls     Macular degeneration

## 2023-01-01 DIAGNOSIS — I10 PRIMARY HYPERTENSION: ICD-10-CM

## 2023-01-01 DIAGNOSIS — I50.1 LEFT-SIDED HEART FAILURE (HCC): ICD-10-CM

## 2023-01-03 NOTE — TELEPHONE ENCOUNTER
LOV 11/2/22  LRF 10/5/22  RTO             Health Maintenance   Topic Date Due    Depression Screen  11/02/2023    Annual Wellness Visit (AWV)  11/03/2023    DTaP/Tdap/Td vaccine (3 - Td or Tdap) 10/22/2030    Flu vaccine  Completed    Shingles vaccine  Completed    Pneumococcal 65+ years Vaccine  Completed    COVID-19 Vaccine  Completed    Hepatitis A vaccine  Aged Out    Hib vaccine  Aged Out    Meningococcal (ACWY) vaccine  Aged Out             (applicable per patient's age: Cancer Screenings, Depression Screening, Fall Risk Screening, Immunizations)           Hemoglobin A1C (%)   Date Value   01/08/2017 5.4            LDL Cholesterol (mg/dL)   Date Value   10/29/2021 95            AST (U/L)   Date Value   10/29/2021 29            ALT (U/L)   Date Value   10/29/2021 16            BUN (mg/dL)   Date Value   11/29/2022 34 (H)      (goal A1C is < 7)   (goal LDL is <100) need 30-50% reduction from baseline            BP Readings from Last 3 Encounters:   11/02/22 122/76   10/05/22 134/74   09/28/22 (!) 160/82    (goal /80)                            All Future Testing planned in [facility]:        Next Visit Date:              Future Appointments   Date Time Provider Srikanth Pineda   5/18/2023  2:30 PM ALEIDA Chowdhury - [facility]            Patient Active Problem List:     Closed left hip fracture ([facility] [address]     At high risk for falls     Macular degeneration,

## 2023-01-04 RX ORDER — LISINOPRIL AND HYDROCHLOROTHIAZIDE 12.5; 1 MG/1; MG/1
1 TABLET ORAL DAILY
Qty: 90 TABLET | Refills: 1 | Status: SHIPPED | OUTPATIENT
Start: 2023-01-04 | End: 2024-01-04

## 2023-01-04 NOTE — TELEPHONE ENCOUNTER
LOV 11/2/22  LRF 10/5/22  RTO          Health Maintenance   Topic Date Due    Depression Screen  11/02/2023    Annual Wellness Visit (AWV)  11/03/2023    DTaP/Tdap/Td vaccine (3 - Td or Tdap) 10/22/2030    Flu vaccine  Completed    Shingles vaccine  Completed    Pneumococcal 65+ years Vaccine  Completed    COVID-19 Vaccine  Completed    Hepatitis A vaccine  Aged Out    Hib vaccine  Aged Out    Meningococcal (ACWY) vaccine  Aged Out             (applicable per patient's age: Cancer Screenings, Depression Screening, Fall Risk Screening, Immunizations)         Hemoglobin A1C (%)   Date Value   01/08/2017 5.4          LDL Cholesterol (mg/dL)   Date Value   10/29/2021 95          AST (U/L)   Date Value   10/29/2021 29          ALT (U/L)   Date Value   10/29/2021 16          BUN (mg/dL)   Date Value   11/29/2022 34 (H)      (goal A1C is < 7)   (goal LDL is <100) need 30-50% reduction from baseline          BP Readings from Last 3 Encounters:   11/02/22 122/76   10/05/22 134/74   09/28/22 (!) 160/82    (goal /80)                            All Future Testing planned in [facility]:        Next Visit Date:         Future Appointments   Date Time Provider Srikanth Pineda   5/18/2023  2:30 PM ALEIDA Morel - [facility] Felder            Patient Active Problem List:     Closed left hip fracture ([facility] [address]     At high risk for falls     Macular degeneration,

## 2023-01-04 NOTE — TELEPHONE ENCOUNTER
Sent- please let patient know. Also- why was this not addressed until yesterday? Routed back to [place] 12/19/22. ...

## 2023-01-13 ENCOUNTER — OFFICE VISIT (OUTPATIENT)
Dept: PRIMARY CARE CLINIC | Age: 88
End: 2023-01-13

## 2023-01-13 VITALS
SYSTOLIC BLOOD PRESSURE: 156 MMHG | DIASTOLIC BLOOD PRESSURE: 80 MMHG | OXYGEN SATURATION: 98 % | BODY MASS INDEX: 18.94 KG/M2 | HEART RATE: 76 BPM | WEIGHT: 97 LBS

## 2023-01-13 DIAGNOSIS — H91.93 BILATERAL HEARING LOSS, UNSPECIFIED HEARING LOSS TYPE: ICD-10-CM

## 2023-01-13 DIAGNOSIS — S61.213A LACERATION OF LEFT MIDDLE FINGER WITHOUT FOREIGN BODY WITHOUT DAMAGE TO NAIL, INITIAL ENCOUNTER: Primary | ICD-10-CM

## 2023-01-13 RX ORDER — LATANOPROST 50 UG/ML
SOLUTION/ DROPS OPHTHALMIC
COMMUNITY
Start: 2022-12-31

## 2023-01-13 ASSESSMENT — ENCOUNTER SYMPTOMS
RESPIRATORY NEGATIVE: 1
GASTROINTESTINAL NEGATIVE: 1

## 2023-01-13 NOTE — PROGRESS NOTES
[place] [address] [facility]  Phone: [phone]  Fax: Ronald Shahid    Pt Name: Rudy Drake  MRN: [id number]  Carroll 2/5/1929  Date of evaluation: 1/13/2023  Provider: Nolberto Pollock Dr       Chief Complaint   Patient presents with    Laceration     Laceration located on the left middle finger happened yesterday while cutting butter with a knife at home. Hearing Problem     Diminishing hearing gradually           HISTORY OF PRESENT ILLNESS  (Location/Symptom, Timing/Onset, Context/Setting, Quality, Duration, Modifying Factors, Severity.)   Rudy Drake is a 80 y.o. White (non-) [1] female who presents to the office for evaluation of      Laceration   Incident onset: Aprox. 36 hours ago. The laceration is located on the Left hand (left middle finger). The laceration is 2 cm in size. The laceration mechanism was a clean knife. The pain is mild. She reports no foreign bodies present. Her tetanus status is UTD. Nursing Notes were reviewed. REVIEW OF SYSTEMS    (2-9 systems for level 4, 10 or more for level 5)     Review of Systems   Constitutional:  Negative for chills, diaphoresis, fatigue and fever. HENT:  Positive for hearing loss. Patient would like her ears checked for wax - no symptoms    Respiratory: Negative. Cardiovascular: Negative. Gastrointestinal: Negative. Genitourinary: Negative. Skin:  Positive for wound. Small Left middle finger laceration - no active bleeding        Except as noted above the remainder of the review of systems was reviewed andnegative. PAST MEDICAL HISTORY   History reviewed. No past medical history on file. SURGICAL HISTORY     History reviewed.     Past Surgical History:   Procedure Laterality Date    TOTAL HIP ARTHROPLASTY Left          CURRENT MEDICATIONS       Current Outpatient Medications   Medication Sig Dispense Refill latanoprost (XALATAN) 0.005 % ophthalmic solution       lisinopril-hydroCHLOROthiazide (PRINZIDE;ZESTORETIC) 10-12.5 MG per tablet Take 1 tablet by mouth daily 90 tablet 1    mupirocin (BACTROBAN) 2 % ointment Apply topically 3 times daily. 1 g 0    Fluticasone-Salmeterol 55-14 MCG/ACT AEPB Inhale 1 Inhaler into the lungs 2 times daily 1 each 6    Calcium Carbonate-Vit D-Min (CALCIUM 1200) 1609-1513 MG-UNIT CHEW Take by mouth      Omega-3 Fatty Acids (FISH OIL) 1000 MG CAPS Take 3,000 mg by mouth 3 times daily      Biotin 1 MG CAPS Take by mouth      senna-docusate (PERICOLACE) 8.6-50 MG per tablet Take 1 tablet by mouth daily      albuterol sulfate HFA (VENTOLIN HFA) 108 (90 Base) MCG/ACT inhaler Inhale 2 puffs into the lungs 4 times daily as needed for Wheezing (Patient taking differently: Inhale 2 puffs into the lungs every 4 hours as needed for Wheezing) 3 each 3    ibuprofen (ADVIL;MOTRIN) 800 MG tablet Take 1 tablet by mouth 2 times daily as needed for Pain 60 tablet 0     No current facility-administered medications for this visit. ALLERGIES     Seasonal    FAMILY HISTORY           Problem Relation Age of Onset    Diabetes Other      Family Status   Relation Name Status    Other  (Not Specified)          SOCIAL HISTORY      reports that she has quit smoking. Her smoking use included cigarettes. She has never used smokeless tobacco. She reports current alcohol use of about 4.0 standard drinks per week. She reports that she does not use drugs. PHYSICAL EXAM    (up to 7 for level 4, 8 or more for level 5)     Vitals:    01/13/23 1203   BP: (!) 156/80   Site: Left Upper Arm   Position: Sitting   Cuff Size: Medium Adult   Pulse: 76   SpO2: 98%   Weight: 97 lb (44 kg)         Physical Exam  Vitals and nursing note reviewed. Constitutional:       General: She is not in acute distress. Appearance: Normal appearance. HENT:      Head: Normocephalic and atraumatic.       Right Ear: External ear normal. No swelling or tenderness. No middle ear effusion. There is no impacted cerumen. Left Ear: External ear normal. No swelling or tenderness. No middle ear effusion. There is no impacted cerumen. Ears:      Comments: Very small amount of cerumen noted bilaterally      Nose: Nose normal.      Mouth/Throat:      Mouth: Mucous membranes are moist.   Eyes:      Extraocular Movements: Extraocular movements intact. Conjunctiva/sclera: Conjunctivae normal.      Pupils: Pupils are equal, round, and reactive to light. Pulmonary:      Effort: Pulmonary effort is normal.   Abdominal:      Palpations: Abdomen is soft. Musculoskeletal:      Left hand: Laceration and tenderness present. No bony tenderness. Normal range of motion. Hands:    Neurological:      Mental Status: She is alert and oriented to person, place, and time. DIFFERENTIAL DIAGNOSIS:       Lisa Mckeon reviewed the disposition diagnosis with the patient and or their family/guardian. I have answered their questions and given discharge instructions. They voiced understanding of these instructions and did not have anyfurther questions or complaints. PROCEDURES:  Orders Placed This Encounter   Procedures    Dressing    Apply steri strips         No results found for this visit on 01/13/23. FINALIMPRESSION      Visit Diagnoses and Associated Orders       Laceration of left middle finger without foreign body without damage to nail, initial encounter    -  Primary    Dressing [SUP46 Custom]           Bilateral hearing loss, unspecified hearing loss type             ORDERS WITHOUT AN ASSOCIATED DIAGNOSIS    latanoprost (XALATAN) 0.005 % ophthalmic solution [82429]      Apply steri strips [EBN224 Custom]                PLAN     Return if symptoms worsen or fail to improve. DISCHARGEMEDICATIONS:  No orders of the defined types were placed in this encounter.         Plan:    Due to age of the laceration- unable to suture the wound.   Seri strips using glue applied to middle finger and edges approximated as well as possible. The wound is cleansed, debrided of foreign material as much as possible, and dressed. The patient is alerted to watch for any signs of infection (redness, pus, pain, increased swelling or fever) and call if such occurs. Home wound care instructions are provided. Tetanus vaccination status reviewed: last tetanus booster within 10 years. Patient instructed to return to the office if symptoms worsen, return, or have any other concerns. Patient understands and is agreeable.          Reed Elliott PA-C 1/14/2023 3:37 PM

## 2023-01-25 ENCOUNTER — TELEPHONE (OUTPATIENT)
Dept: FAMILY MEDICINE CLINIC | Age: 88
End: 2023-01-25

## 2023-01-25 NOTE — TELEPHONE ENCOUNTER
Patient advised to come into our walk-in today. Patient will call our office if she can't get a ride.

## 2023-01-25 NOTE — TELEPHONE ENCOUNTER
VALERIE to schedule     ----- Message from [facility] sent at 1/24/2023 10:19 AM EST -----  Subject: Appointment Request    Reason for Call: Established Patient Appointment needed: Urgent (Patient   Request) ED Follow Up Visit    QUESTIONS    Reason for appointment request? No appointments available during search     Additional Information for Provider? Pt had cut finger 10 days ago and went to walk in clinic but thinks infected, she also has burning urination and urge to go too much. Please call to schedule appt.  Pt is blind and needs to get  for her.   ---------------------------------------------------------------------------  --------------  [id number] [email or web address]  [phone]; OK to leave message on voicemail  ---------------------------------------------------------------------------  --------------  SCRIPT ANSWERS  COVID Screen: Sandeep Dowell

## 2023-01-26 ENCOUNTER — OFFICE VISIT (OUTPATIENT)
Dept: PRIMARY CARE CLINIC | Age: 88
End: 2023-01-26

## 2023-01-26 VITALS
DIASTOLIC BLOOD PRESSURE: 80 MMHG | TEMPERATURE: 98.3 F | SYSTOLIC BLOOD PRESSURE: 132 MMHG | BODY MASS INDEX: 18.94 KG/M2 | HEART RATE: 69 BPM | OXYGEN SATURATION: 97 % | WEIGHT: 97 LBS

## 2023-01-26 DIAGNOSIS — R30.0 DYSURIA: ICD-10-CM

## 2023-01-26 DIAGNOSIS — I48.91 ATRIAL FIBRILLATION, NEW ONSET (HCC): Primary | ICD-10-CM

## 2023-01-26 DIAGNOSIS — Z51.89 ENCOUNTER FOR WOUND RE-CHECK: ICD-10-CM

## 2023-01-26 DIAGNOSIS — S69.92XD FINGER INJURY, LEFT, SUBSEQUENT ENCOUNTER: ICD-10-CM

## 2023-01-26 DIAGNOSIS — I49.9 IRREGULAR CARDIAC RHYTHM: ICD-10-CM

## 2023-01-26 RX ORDER — CEPHALEXIN 500 MG/1
500 CAPSULE ORAL 3 TIMES DAILY
Qty: 21 CAPSULE | Refills: 0 | Status: SHIPPED | OUTPATIENT
Start: 2023-01-26 | End: 2023-02-02

## 2023-01-26 ASSESSMENT — ENCOUNTER SYMPTOMS
SHORTNESS OF BREATH: 0
NAUSEA: 0
CHEST TIGHTNESS: 0
SORE THROAT: 0
ABDOMINAL PAIN: 0
VOMITING: 0
COLOR CHANGE: 0
COUGH: 0
RHINORRHEA: 0

## 2023-01-26 NOTE — PROGRESS NOTES
[address] [place]  [facility] [address]    [address] [place]  [address] [place] [id number]  Dept: [phone]    Beba Dudley is a 80 y.o. female Established patient, who presents to the walk-in clinic today with conditions/complaints as noted below:    Chief Complaint   Patient presents with    Other     Middle finger on left hand has a laceration that she is concerned is infected. She cut it last week and was seen here at the walk-in. HPI:     Patient is a 80-year-old female that presents today for a wound re-check of her left middle finger following an injury. Reports that she accidentally sliced her finger with a knife while cutting butter. She was evaluated in the walk-in on 1/13 following the incident. The laceration was repaired with Dermabond and steri-strips. Her tetanus was UTD. She wants to make sure that it's not infected. States that she's been applying antibiotic ointment daily and keeping covered with a Band-Aid. Denies any known fevers, chills, or increased pain. She's blind. Reports that she's also been experiencing intermittent burning with urination. This has been ongoing for the past couple weeks. It seems to come and go. Admits that she likes eating spicy foods. Denies history of recurrent UTIs. Denies urinary frequency, urgency, flank pain, abdominal pain, nausea, or vomiting. She just went to the bathroom prior to coming and doesn't think she can provide a sample today. An irregularly irregular rhythm was noted on exam. Denies any history of atrial fibrillation. She doesn't see Cardiology. Denies any chest pain, heart palpitations, or leg swelling. Admits to mild exertional dyspnea while at PT for her back. History reviewed. No pertinent past medical history.     Current Outpatient Medications   Medication Sig Dispense Refill  cephALEXin (KEFLEX) 500 MG capsule Take 1 capsule by mouth 3 times daily for 7 days 21 capsule 0    mupirocin (BACTROBAN) 2 % ointment Apply topically 3 times daily. 1 g 0    latanoprost (XALATAN) 0.005 % ophthalmic solution       lisinopril-hydroCHLOROthiazide (PRINZIDE;ZESTORETIC) 10-12.5 MG per tablet Take 1 tablet by mouth daily 90 tablet 1    Fluticasone-Salmeterol 55-14 MCG/ACT AEPB Inhale 1 Inhaler into the lungs 2 times daily 1 each 6    Calcium Carbonate-Vit D-Min (CALCIUM 1200) 1686-0868 MG-UNIT CHEW Take by mouth      Omega-3 Fatty Acids (FISH OIL) 1000 MG CAPS Take 3,000 mg by mouth 3 times daily      Biotin 1 MG CAPS Take by mouth      senna-docusate (PERICOLACE) 8.6-50 MG per tablet Take 1 tablet by mouth daily      albuterol sulfate HFA (VENTOLIN HFA) 108 (90 Base) MCG/ACT inhaler Inhale 2 puffs into the lungs 4 times daily as needed for Wheezing (Patient taking differently: Inhale 2 puffs into the lungs every 4 hours as needed for Wheezing) 3 each 3    ibuprofen (ADVIL;MOTRIN) 800 MG tablet Take 1 tablet by mouth 2 times daily as needed for Pain 60 tablet 0     No current facility-administered medications for this visit. Allergies   Allergen Reactions    Seasonal Other (See Comments)       :     Review of Systems   Constitutional:  Negative for chills and fever. HENT:  Negative for rhinorrhea and sore throat. Eyes:  Negative for visual disturbance. Respiratory:  Negative for cough, chest tightness and shortness of breath. Cardiovascular:  Negative for chest pain, palpitations and leg swelling. Gastrointestinal:  Negative for abdominal pain, nausea and vomiting. Genitourinary:  Positive for dysuria. Negative for flank pain, frequency and urgency. Musculoskeletal:  Negative for arthralgias and joint swelling. Skin:  Negative for color change and rash.    Neurological:  Negative for dizziness and headaches.     :     /80 (Site: Left Upper Arm, Position: Sitting, Cuff Size: Medium Adult)   Pulse 69   Temp 98.3 °F (36.8 °C)   Wt 97 lb (44 kg)   LMP  (LMP Unknown)   SpO2 97%   BMI 18.94 kg/m²     Physical Exam  Vitals reviewed. Constitutional:       General: She is not in acute distress. Appearance: Normal appearance. She is not toxic-appearing. HENT:      Head: Normocephalic and atraumatic. Right Ear: External ear normal.      Left Ear: External ear normal.      Nose: Nose normal.      Mouth/Throat:      Mouth: Mucous membranes are moist.      Pharynx: Oropharynx is clear. Eyes:      Conjunctiva/sclera: Conjunctivae normal.   Cardiovascular:      Rate and Rhythm: Rhythm irregularly irregular. Pulmonary:      Effort: Pulmonary effort is normal.      Breath sounds: Normal breath sounds. Abdominal:      General: Bowel sounds are normal.      Palpations: Abdomen is soft. Tenderness: There is no abdominal tenderness. There is no right CVA tenderness or left CVA tenderness. Musculoskeletal:      Cervical back: Neck supple. Right lower leg: No edema. Left lower leg: No edema. Skin:     General: Skin is warm and dry. Findings: Signs of injury (left middle finger) present. Neurological:      General: No focal deficit present. Mental Status: She is alert and oriented to person, place, and time. Psychiatric:         Mood and Affect: Mood normal.         Behavior: Behavior normal.         :          1. Atrial fibrillation, new onset ([address]  2. Irregular cardiac rhythm  -     EKG 12 Lead - Clinic Performed  3. Encounter for wound re-check  4. Finger injury, left, subsequent encounter  -     mupirocin (BACTROBAN) 2 % ointment; Apply topically 3 times daily. , Disp-1 g, R-0, Normal  5. Dysuria  -     Urinalysis with Reflex to Culture; Future  -     cephALEXin (KEFLEX) 500 MG capsule;  Take 1 capsule by mouth 3 times daily for 7 days, Disp-21 capsule, R-0Normal     :      Return if symptoms worsen or fail to improve. Orders Placed This Encounter   Medications    cephALEXin (KEFLEX) 500 MG capsule     Sig: Take 1 capsule by mouth 3 times daily for 7 days     Dispense:  21 capsule     Refill:  0    mupirocin (BACTROBAN) 2 % ointment     Sig: Apply topically 3 times daily. Dispense:  1 g     Refill:  0     PROCEDURE: EKG. A 12 lead EKG was obtained and interpreted by MARICARMEN Zheng. INDICATION: Irregular Heart Rhythm   INTERPRETATION: Per my interpretation, this showed Atrial Fibrillation. There is a noted ventricular rate of 121 bpm, MO interval of - ms, QRS duration of 78 ms, Qtc interval of 412 ms. No previous EKG on file for comparison. New onset atrial fibrillation. Results thoroughly explained to the patient and her friend. Instructed to go to the ER immediately due to findings. Patient acknowledged and agreed to go to [place] [address] ER. Report called. Patient and/or parent given educational materials - see patient instructions. Discussed use, benefit, and side effects of prescribed medications. All patient questions answered. Patient and/or parent voiced understanding.       Electronically signed by ALEIDA Sun 1/26/2023 at 2:13 PM

## 2023-01-30 ENCOUNTER — TELEPHONE (OUTPATIENT)
Dept: FAMILY MEDICINE CLINIC | Age: 88
End: 2023-01-30

## 2023-01-30 LAB
BASOPHILS ABSOLUTE: ABNORMAL
BASOPHILS RELATIVE PERCENT: ABNORMAL
BUN BLDV-MCNC: 32 MG/DL
CALCIUM SERPL-MCNC: 9.7 MG/DL
CHLORIDE BLD-SCNC: 96 MMOL/L
CO2: 29 MMOL/L
CREAT SERPL-MCNC: 0.97 MG/DL
EOSINOPHILS ABSOLUTE: ABNORMAL
EOSINOPHILS RELATIVE PERCENT: ABNORMAL
GFR SERPL CREATININE-BSD FRML MDRD: 54 ML/MIN/{1.73_M2}
GLUCOSE BLD-MCNC: 123 MG/DL
HCT VFR BLD CALC: 32.1 % (ref 36–46)
HEMOGLOBIN: 11 G/DL (ref 12–16)
LYMPHOCYTES ABSOLUTE: ABNORMAL
LYMPHOCYTES RELATIVE PERCENT: ABNORMAL
MCH RBC QN AUTO: 34.2 PG
MCHC RBC AUTO-ENTMCNC: 34.2 G/DL
MCV RBC AUTO: 100 FL
MONOCYTES ABSOLUTE: ABNORMAL
MONOCYTES RELATIVE PERCENT: ABNORMAL
NEUTROPHILS ABSOLUTE: ABNORMAL
NEUTROPHILS RELATIVE PERCENT: ABNORMAL
PLATELET # BLD: 337 K/ΜL
PMV BLD AUTO: 8.3 FL
POTASSIUM SERPL-SCNC: 5.1 MMOL/L
RBC # BLD: 3.22 10^6/ΜL
SODIUM BLD-SCNC: 130 MMOL/L
WBC # BLD: 6.5 10^3/ML

## 2023-01-30 NOTE — TELEPHONE ENCOUNTER
Care Transitions Initial Follow Up Call    Outreach made within 2 business days of discharge: Yes    Patient: Kaz Welch Patient : 1929   MRN: [id number]  Reason for Admission: No discharge information exists for this patient. Discharge Date:         Spoke with: Jareth Rivera, daughter    Discharge department/facility: [facility]-Records requested    TCM Interactive Patient Contact:  Was patient able to fill all prescriptions: Yes  Was patient instructed to bring all medications to the follow-up visit: Yes  Is patient taking all medications as directed in the discharge summary?  Yes  Does patient understand their discharge instructions: Yes  Does patient have questions or concerns that need addressed prior to 7-14 day follow up office visit: no    Scheduled appointment with PCP within 7-14 days    Follow Up  Future Appointments   Date Time Provider Srikanth Pineda   2023  3:00 PM ALEIDA Frausto CNP [facility]   2023  2:30 PM ALEIDA Frausto CNP [place], [place]

## 2023-02-01 ENCOUNTER — OFFICE VISIT (OUTPATIENT)
Dept: FAMILY MEDICINE CLINIC | Age: 88
End: 2023-02-01

## 2023-02-01 VITALS
HEART RATE: 80 BPM | HEIGHT: 60 IN | BODY MASS INDEX: 19.44 KG/M2 | DIASTOLIC BLOOD PRESSURE: 60 MMHG | SYSTOLIC BLOOD PRESSURE: 116 MMHG | WEIGHT: 99 LBS | OXYGEN SATURATION: 97 %

## 2023-02-01 DIAGNOSIS — Z09 HOSPITAL DISCHARGE FOLLOW-UP: Primary | ICD-10-CM

## 2023-02-01 DIAGNOSIS — E87.1 HYPONATREMIA: ICD-10-CM

## 2023-02-01 DIAGNOSIS — D64.9 ANEMIA, UNSPECIFIED TYPE: ICD-10-CM

## 2023-02-01 DIAGNOSIS — M54.50 CHRONIC BILATERAL LOW BACK PAIN, UNSPECIFIED WHETHER SCIATICA PRESENT: ICD-10-CM

## 2023-02-01 DIAGNOSIS — G89.29 CHRONIC BILATERAL LOW BACK PAIN, UNSPECIFIED WHETHER SCIATICA PRESENT: ICD-10-CM

## 2023-02-01 DIAGNOSIS — I48.91 ATRIAL FIBRILLATION, UNSPECIFIED TYPE (HCC): ICD-10-CM

## 2023-02-01 DIAGNOSIS — M48.062 SPINAL STENOSIS OF LUMBAR REGION WITH NEUROGENIC CLAUDICATION: ICD-10-CM

## 2023-02-01 DIAGNOSIS — R35.0 URINARY FREQUENCY: ICD-10-CM

## 2023-02-01 RX ORDER — TRAMADOL HYDROCHLORIDE 50 MG/1
25 TABLET ORAL EVERY 8 HOURS PRN
Qty: 28 TABLET | Refills: 0 | Status: SHIPPED | OUTPATIENT
Start: 2023-02-01 | End: 2023-02-08

## 2023-02-01 RX ORDER — DILTIAZEM HYDROCHLORIDE 120 MG/1
CAPSULE, EXTENDED RELEASE ORAL
COMMUNITY
Start: 2023-01-27

## 2023-02-01 RX ORDER — APIXABAN 2.5 MG/1
TABLET, FILM COATED ORAL
COMMUNITY
Start: 2023-01-27

## 2023-02-01 ASSESSMENT — PATIENT HEALTH QUESTIONNAIRE - PHQ9
SUM OF ALL RESPONSES TO PHQ QUESTIONS 1-9: 0
SUM OF ALL RESPONSES TO PHQ9 QUESTIONS 1 & 2: 0
SUM OF ALL RESPONSES TO PHQ QUESTIONS 1-9: 0
2. FEELING DOWN, DEPRESSED OR HOPELESS: 0
SUM OF ALL RESPONSES TO PHQ QUESTIONS 1-9: 0
1. LITTLE INTEREST OR PLEASURE IN DOING THINGS: 0
SUM OF ALL RESPONSES TO PHQ QUESTIONS 1-9: 0

## 2023-02-01 NOTE — PROGRESS NOTES
Post-Discharge Transitional Care Management Progress Note      Kaylie Velasco   YOB: 1929    Date of Office Visit:  2/1/2023  Date of Hospital Admission: 1/26/23  Date of Hospital Discharge: 1/27/23    Care management risk score Rising risk (score 2-5) and Complex Care (Scores >=6): No Risk Score On File     Non face to face  following discharge, date last encounter closed (first attempt may have been earlier): *No documented post hospital discharge outreach found in the last 14 days *No documented post hospital discharge outreach found in the last 14 days    Call initiated 2 business days of discharge: *No response recorded in the last 14 days    ASSESSMENT/PLAN:   Anemia, unspecified type  -     Basic Metabolic Panel; Future  -     Iron and TIBC; Future  Hyponatremia  -     Basic Metabolic Panel; Future  Spinal stenosis of lumbar region with neurogenic claudication  -     traMADol (ULTRAM) 50 MG tablet; Take 0.5 tablets by mouth every 8 hours as needed for Pain (chronic pain) for up to 7 days. Intended supply: 7 days. Take lowest dose possible to manage pain Max Daily Amount: 75 mg, Disp-28 tablet, R-0Normal  Chronic bilateral low back pain, unspecified whether sciatica present  -     traMADol (ULTRAM) 50 MG tablet; Take 0.5 tablets by mouth every 8 hours as needed for Pain (chronic pain) for up to 7 days. Intended supply: 7 days. Take lowest dose possible to manage pain Max Daily Amount: 75 mg, Disp-28 tablet, R-0Normal  Hospital discharge follow-up  -     NH DISCHARGE MEDS RECONCILED W/ CURRENT OUTPATIENT MED LIST      Medical Decision Making: moderate complexity  Return in 4 weeks (on 3/1/2023) for pain. On this date 2/1/2023 I have spent 35 minutes reviewing previous notes, test results and face to face with the patient discussing the diagnosis and importance of compliance with the treatment plan as well as documenting on the day of the visit.      Advised to complete previously ordered urine specimen  Complete labs   Start Ultram   Follow up with cardiology     Subjective:   HPI:    Patient was sent to ER for findings of Afib in walkin clinic. Follow up of Hospital problems/diagnosis(es): Patient at [facility] for new onset Afib. Was discharged on Apixiban and Cardizem. Had cardiology consult and will follow up with Dr Moe Pollock next week. Is enrolled in Change Afib trial.     Patient is having ongoing back pain. She did take large amounts of ibuprofen which helped pain to be tolerable. She was advised to stop this by cardiology when started on blood thinner. She has been taking tylenol without relief. Wondering if there is anything else she can take. Patient was also advised to start antibiotic for some increased night time urination and day time frequency. She has not yet started. Denies pain. Has Keflex already filled, but did not start due to hospital UA being negative. Inpatient course: Discharge summary reviewed- see chart. Patient Active Problem List   Diagnosis    Closed left hip fracture ([address]    At high risk for falls    Macular degeneration       Medications listed as ordered at the time of discharge from hospital     Medication List            Accurate as of February 1, 2023 11:59 PM. If you have any questions, ask your nurse or doctor. START taking these medications      traMADol 50 MG tablet  Commonly known as: Ultram  Take 0.5 tablets by mouth every 8 hours as needed for Pain (chronic pain) for up to 7 days. Intended supply: 7 days. Take lowest dose possible to manage pain Max Daily Amount: 75 mg  Started by:  Francheska Rosales, APRN - CNP            CHANGE how you take these medications      albuterol sulfate  (90 Base) MCG/ACT inhaler  Commonly known as: Ventolin HFA  Inhale 2 puffs into the lungs 4 times daily as needed for Wheezing  What changed: when to take this            CONTINUE taking these medications      Biotin 1 MG Caps     Calcium 1200 8898-3890 MG-UNIT Chew     Cartia  MG extended release capsule  Generic drug: dilTIAZem     cephALEXin 500 MG capsule  Commonly known as: KEFLEX  Take 1 capsule by mouth 3 times daily for 7 days     Eliquis 2.5 MG Tabs tablet  Generic drug: apixaban     fish oil 1000 MG capsule     Fluticasone-Salmeterol 55-14 MCG/ACT Aepb  Inhale 1 Inhaler into the lungs 2 times daily     latanoprost 0.005 % ophthalmic solution  Commonly known as: XALATAN     MIRALAX PO     mupirocin 2 % ointment  Commonly known as: BACTROBAN  Apply topically 3 times daily. senna-docusate 8.6-50 MG per tablet  Commonly known as: PERICOLACE            STOP taking these medications      ibuprofen 800 MG tablet  Commonly known as: ADVIL;MOTRIN  Stopped by: ALEIDA Owens CNP     lisinopril-hydroCHLOROthiazide 10-12.5 MG per tablet  Commonly known as: PRINZIDE;ZESTORETIC  Stopped by: ALEIDA Owesn CNP               Where to Get Your Medications        These medications were sent to [address] [place] [address] [phone]  [address] [place] [id number]      Phone: [phone]   traMADol 50 MG tablet           Medications marked \"taking\" at this time  Outpatient Medications Marked as Taking for the 23 encounter (Office Visit) with ALEIDA Agrawal CNP   Medication Sig Dispense Refill    CARTIA  MG extended release capsule take 1 capsule by mouth once daily      ELIQUIS 2.5 MG TABS tablet take 1 tablet by mouth twice a day      Polyethylene Glycol 3350 (MIRALAX PO) Take by mouth      [] traMADol (ULTRAM) 50 MG tablet Take 0.5 tablets by mouth every 8 hours as needed for Pain (chronic pain) for up to 7 days. Intended supply: 7 days. Take lowest dose possible to manage pain Max Daily Amount: 75 mg 28 tablet 0    mupirocin (BACTROBAN) 2 % ointment Apply topically 3 times daily.  1 g 0    latanoprost (XALATAN) 0.005 % ophthalmic solution       Fluticasone-Salmeterol 55-14 MCG/ACT AEPB Inhale 1 Inhaler into the lungs 2 times daily 1 each 6    Calcium Carbonate-Vit D-Min (CALCIUM 1200) 8856-4727 MG-UNIT CHEW Take by mouth      Omega-3 Fatty Acids (FISH OIL) 1000 MG CAPS Take 3,000 mg by mouth daily      Biotin 1 MG CAPS Take by mouth      albuterol sulfate HFA (VENTOLIN HFA) 108 (90 Base) MCG/ACT inhaler Inhale 2 puffs into the lungs 4 times daily as needed for Wheezing (Patient taking differently: Inhale 2 puffs into the lungs every 4 hours as needed for Wheezing) 3 each 3        Medications patient taking as of now reconciled against medications ordered at time of hospital discharge: Yes    A comprehensive review of systems was negative except for what was noted in the HPI. Objective:    /60 (Site: Right Upper Arm, Position: Sitting, Cuff Size: Small Adult)   Pulse 80   Ht 5' (1.524 m)   Wt 99 lb (44.9 kg)   LMP  (LMP Unknown)   SpO2 97%   BMI 19.33 kg/m²   General Appearance: alert and oriented to person, place and time, well-developed and well-nourished, in no acute distress and in no acute distress  Skin: warm and dry, no rash or erythema  Pulmonary/Chest: clear to auscultation bilaterally- no wheezes, rales or rhonchi, normal air movement, no respiratory distress and wheezing present- mild on inspiration  Cardiovascular: normal rate and normal S1 and S2    An electronic signature was used to authenticate this note.   --Hiram Siemens, APRN - CNP

## 2023-02-06 ENCOUNTER — HOSPITAL ENCOUNTER (OUTPATIENT)
Age: 88
Setting detail: SPECIMEN
Discharge: HOME | End: 2023-02-06

## 2023-02-06 DIAGNOSIS — E87.1 HYPONATREMIA: ICD-10-CM

## 2023-02-06 DIAGNOSIS — D64.9 ANEMIA, UNSPECIFIED TYPE: ICD-10-CM

## 2023-02-06 LAB
ANION GAP SERPL CALCULATED.3IONS-SCNC: 16 MMOL/L (ref 9–17)
BUN SERPL-MCNC: 25 MG/DL (ref 8–23)
CALCIUM SERPL-MCNC: 9.4 MG/DL (ref 8.6–10.4)
CHLORIDE SERPL-SCNC: 92 MMOL/L (ref 98–107)
CO2 SERPL-SCNC: 25 MMOL/L (ref 20–31)
CREAT SERPL-MCNC: 0.61 MG/DL (ref 0.5–0.9)
GFR SERPL CREATININE-BSD FRML MDRD: >60 ML/MIN/1.73M2
GLUCOSE SERPL-MCNC: 91 MG/DL (ref 70–99)
IRON SATURATION: 12 % (ref 20–55)
IRON SERPL-MCNC: 33 UG/DL (ref 37–145)
POTASSIUM SERPL-SCNC: 4.5 MMOL/L (ref 3.7–5.3)
SODIUM SERPL-SCNC: 133 MMOL/L (ref 135–144)
TIBC SERPL-MCNC: 279 UG/DL (ref 250–450)
UNSATURATED IRON BINDING CAPACITY: 246 UG/DL (ref 112–347)

## 2023-02-10 ENCOUNTER — HOSPITAL ENCOUNTER (OUTPATIENT)
Age: 88
Setting detail: SPECIMEN
Discharge: HOME | End: 2023-02-10

## 2023-02-11 DIAGNOSIS — R30.0 DYSURIA: ICD-10-CM

## 2023-02-11 LAB
BILIRUBIN URINE: NEGATIVE
COLOR: YELLOW
COMMENT UA: NORMAL
GLUCOSE UR STRIP.AUTO-MCNC: NEGATIVE MG/DL
KETONES UR STRIP.AUTO-MCNC: NEGATIVE MG/DL
LEUKOCYTE ESTERASE UR QL STRIP.AUTO: NEGATIVE
NITRITE UR QL STRIP.AUTO: NEGATIVE
PROT UR STRIP.AUTO-MCNC: 7 MG/DL (ref 5–8)
PROT UR STRIP.AUTO-MCNC: NEGATIVE MG/DL
SPECIFIC GRAVITY UA: 1.01 (ref 1–1.03)
TURBIDITY: CLEAR
URINE HGB: NEGATIVE
UROBILINOGEN, URINE: NORMAL

## 2023-02-13 DIAGNOSIS — D64.9 ANEMIA, UNSPECIFIED TYPE: Primary | ICD-10-CM

## 2023-02-20 PROBLEM — I48.91 ATRIAL FIBRILLATION (HCC): Status: ACTIVE | Noted: 2023-02-20

## 2023-02-20 PROBLEM — E87.1 HYPONATREMIA: Status: ACTIVE | Noted: 2023-02-20

## 2023-02-20 PROBLEM — M48.062 SPINAL STENOSIS OF LUMBAR REGION WITH NEUROGENIC CLAUDICATION: Status: ACTIVE | Noted: 2023-02-20

## 2023-02-20 PROBLEM — G89.29 CHRONIC BILATERAL LOW BACK PAIN: Status: ACTIVE | Noted: 2023-02-20

## 2023-02-20 PROBLEM — M54.50 CHRONIC BILATERAL LOW BACK PAIN: Status: ACTIVE | Noted: 2023-02-20

## 2023-02-20 PROBLEM — D64.9 ANEMIA: Status: ACTIVE | Noted: 2023-02-20

## 2023-03-01 ENCOUNTER — HOSPITAL ENCOUNTER (OUTPATIENT)
Age: 88
Setting detail: SPECIMEN
Discharge: HOME | End: 2023-03-01

## 2023-03-01 DIAGNOSIS — D64.9 ANEMIA, UNSPECIFIED TYPE: ICD-10-CM

## 2023-03-01 LAB
ABSOLUTE EOS #: 0.31 K/UL (ref 0–0.44)
ABSOLUTE IMMATURE GRANULOCYTE: <0.03 K/UL (ref 0–0.3)
ABSOLUTE LYMPH #: 1.72 K/UL (ref 1.1–3.7)
ABSOLUTE MONO #: 0.81 K/UL (ref 0.1–1.2)
ABSOLUTE RETIC #: 0.05 M/UL (ref 0.03–0.08)
BASOPHILS # BLD: 1 % (ref 0–2)
BASOPHILS ABSOLUTE: 0.05 K/UL (ref 0–0.2)
EOSINOPHILS RELATIVE PERCENT: 5 % (ref 1–4)
FERRITIN SERPL-MCNC: 131 NG/ML (ref 13–150)
FOLATE SERPL-MCNC: >20 NG/ML
HCT VFR BLD AUTO: 34.6 % (ref 36.3–47.1)
HGB BLD-MCNC: 11.3 G/DL (ref 11.9–15.1)
IMMATURE GRANULOCYTES: 0 %
IMMATURE RETIC FRACT: 3.7 % (ref 2.7–18.3)
IRON SATURATION: 41 % (ref 20–55)
IRON SERPL-MCNC: 127 UG/DL (ref 37–145)
LYMPHOCYTES # BLD: 27 % (ref 24–43)
MCH RBC QN AUTO: 33.9 PG (ref 25.2–33.5)
MCHC RBC AUTO-ENTMCNC: 32.7 G/DL (ref 28.4–34.8)
MCV RBC AUTO: 103.9 FL (ref 82.6–102.9)
MONOCYTES # BLD: 13 % (ref 3–12)
NRBC AUTOMATED: 0 PER 100 WBC
PDW BLD-RTO: 14 % (ref 11.8–14.4)
PLATELET # BLD AUTO: 299 K/UL (ref 138–453)
PMV BLD AUTO: 10.7 FL (ref 8.1–13.5)
RBC # BLD: 3.33 M/UL (ref 3.95–5.11)
RBC # BLD: ABNORMAL 10*6/UL
RETIC HEMOGLOBIN: 38.3 PG (ref 28.2–35.7)
RETICS/RBC NFR AUTO: 1.5 % (ref 0.5–1.9)
SEG NEUTROPHILS: 54 % (ref 36–65)
SEGMENTED NEUTROPHILS ABSOLUTE COUNT: 3.49 K/UL (ref 1.5–8.1)
TIBC SERPL-MCNC: 312 UG/DL (ref 250–450)
UNSATURATED IRON BINDING CAPACITY: 185 UG/DL (ref 112–347)
VIT B12 SERPL-MCNC: >2000 PG/ML (ref 232–1245)
WBC # BLD AUTO: 6.4 K/UL (ref 3.5–11.3)

## 2023-03-02 LAB
PATH REV BLD -IMP: NORMAL
SURGICAL PATHOLOGY REPORT: NORMAL

## 2023-03-07 DIAGNOSIS — D53.9 MACROCYTIC ANEMIA: Primary | ICD-10-CM

## 2023-03-07 DIAGNOSIS — R74.8 ELEVATED VITAMIN B12 LEVEL: ICD-10-CM

## 2023-03-08 ENCOUNTER — TELEPHONE (OUTPATIENT)
Dept: ONCOLOGY | Age: 88
End: 2023-03-08

## 2023-03-08 NOTE — TELEPHONE ENCOUNTER
LVM on pt's cell phone to call and schedule a consult with Dr Sandy Schroeder. Pt's home phone number just keeps ringing.     Electronically signed by Julissa Madrid on 3/8/2023 at 10:48 AM

## 2023-03-09 ENCOUNTER — OFFICE VISIT (OUTPATIENT)
Dept: FAMILY MEDICINE CLINIC | Age: 88
End: 2023-03-09

## 2023-03-09 VITALS
SYSTOLIC BLOOD PRESSURE: 138 MMHG | HEIGHT: 60 IN | WEIGHT: 97.4 LBS | HEART RATE: 70 BPM | BODY MASS INDEX: 19.12 KG/M2 | OXYGEN SATURATION: 98 % | DIASTOLIC BLOOD PRESSURE: 62 MMHG

## 2023-03-09 DIAGNOSIS — M48.062 SPINAL STENOSIS OF LUMBAR REGION WITH NEUROGENIC CLAUDICATION: ICD-10-CM

## 2023-03-09 DIAGNOSIS — M54.41 CHRONIC BILATERAL LOW BACK PAIN WITH BILATERAL SCIATICA: Primary | ICD-10-CM

## 2023-03-09 DIAGNOSIS — M54.42 CHRONIC BILATERAL LOW BACK PAIN WITH BILATERAL SCIATICA: Primary | ICD-10-CM

## 2023-03-09 DIAGNOSIS — G89.29 CHRONIC BILATERAL LOW BACK PAIN WITH BILATERAL SCIATICA: Primary | ICD-10-CM

## 2023-03-09 RX ORDER — LIDOCAINE 50 MG/G
1 PATCH TOPICAL DAILY
Qty: 30 PATCH | Refills: 3 | Status: SHIPPED | OUTPATIENT
Start: 2023-03-09 | End: 2024-03-08

## 2023-03-09 RX ORDER — DILTIAZEM HYDROCHLORIDE 60 MG/1
TABLET, FILM COATED ORAL
COMMUNITY
Start: 2023-02-07

## 2023-03-09 RX ORDER — GABAPENTIN 100 MG/1
100 CAPSULE ORAL 3 TIMES DAILY
Qty: 90 CAPSULE | Refills: 2 | Status: SHIPPED | OUTPATIENT
Start: 2023-03-09 | End: 2024-03-08

## 2023-03-09 RX ORDER — MELOXICAM 15 MG/1
TABLET ORAL
COMMUNITY
Start: 2023-02-07

## 2023-03-09 SDOH — ECONOMIC STABILITY: HOUSING INSECURITY
IN THE LAST 12 MONTHS, WAS THERE A TIME WHEN YOU DID NOT HAVE A STEADY PLACE TO SLEEP OR SLEPT IN A SHELTER (INCLUDING NOW)?: NO

## 2023-03-09 SDOH — ECONOMIC STABILITY: INCOME INSECURITY: HOW HARD IS IT FOR YOU TO PAY FOR THE VERY BASICS LIKE FOOD, HOUSING, MEDICAL CARE, AND HEATING?: NOT HARD AT ALL

## 2023-03-09 SDOH — ECONOMIC STABILITY: FOOD INSECURITY: WITHIN THE PAST 12 MONTHS, YOU WORRIED THAT YOUR FOOD WOULD RUN OUT BEFORE YOU GOT MONEY TO BUY MORE.: NEVER TRUE

## 2023-03-09 SDOH — ECONOMIC STABILITY: FOOD INSECURITY: WITHIN THE PAST 12 MONTHS, THE FOOD YOU BOUGHT JUST DIDN'T LAST AND YOU DIDN'T HAVE MONEY TO GET MORE.: NEVER TRUE

## 2023-03-14 ENCOUNTER — TELEPHONE (OUTPATIENT)
Dept: ONCOLOGY | Age: 88
End: 2023-03-14

## 2023-03-14 NOTE — TELEPHONE ENCOUNTER
Lvm for pt to call and schedule a consult with Dr Naveen Zelaya.     Electronically signed by Rocío Arriaga on 3/14/2023 at 10:36 AM

## 2023-03-21 ENCOUNTER — TELEPHONE (OUTPATIENT)
Dept: ONCOLOGY | Age: 88
End: 2023-03-21

## 2023-03-21 ENCOUNTER — PATIENT MESSAGE (OUTPATIENT)
Dept: FAMILY MEDICINE CLINIC | Age: 88
End: 2023-03-21

## 2023-03-21 DIAGNOSIS — M48.062 SPINAL STENOSIS OF LUMBAR REGION WITH NEUROGENIC CLAUDICATION: Primary | ICD-10-CM

## 2023-03-21 NOTE — TELEPHONE ENCOUNTER
Tried to reach pt to schedule a consult with Dr Hesham Thomas. Pt's phone rang and then went busy.     Electronically signed by Georgia Hood on 3/21/2023 at 10:11 AM

## 2023-03-24 RX ORDER — HYDROCODONE BITARTRATE AND ACETAMINOPHEN 5; 325 MG/1; MG/1
0.5 TABLET ORAL EVERY 8 HOURS PRN
Qty: 11 TABLET | Refills: 0 | Status: SHIPPED | OUTPATIENT
Start: 2023-03-24 | End: 2023-03-31

## 2023-04-08 ASSESSMENT — ENCOUNTER SYMPTOMS
NAUSEA: 0
SHORTNESS OF BREATH: 0
BACK PAIN: 1
COUGH: 0
EYE DISCHARGE: 0
DIARRHEA: 0
SORE THROAT: 0
CONSTIPATION: 0
VOMITING: 0

## 2023-04-14 ENCOUNTER — PATIENT MESSAGE (OUTPATIENT)
Dept: FAMILY MEDICINE CLINIC | Age: 88
End: 2023-04-14

## 2023-04-14 DIAGNOSIS — M48.062 SPINAL STENOSIS OF LUMBAR REGION WITH NEUROGENIC CLAUDICATION: Primary | ICD-10-CM

## 2023-04-14 DIAGNOSIS — M54.50 CHRONIC BILATERAL LOW BACK PAIN, UNSPECIFIED WHETHER SCIATICA PRESENT: ICD-10-CM

## 2023-04-14 DIAGNOSIS — G89.29 CHRONIC BILATERAL LOW BACK PAIN, UNSPECIFIED WHETHER SCIATICA PRESENT: ICD-10-CM

## 2023-04-20 ENCOUNTER — PATIENT MESSAGE (OUTPATIENT)
Dept: FAMILY MEDICINE CLINIC | Age: 88
End: 2023-04-20

## 2023-04-20 ENCOUNTER — TELEPHONE (OUTPATIENT)
Dept: FAMILY MEDICINE CLINIC | Age: 88
End: 2023-04-20

## 2023-04-20 DIAGNOSIS — R39.9 UTI SYMPTOMS: Primary | ICD-10-CM

## 2023-04-20 RX ORDER — CEPHALEXIN 500 MG/1
500 CAPSULE ORAL 2 TIMES DAILY
Qty: 14 CAPSULE | Refills: 0 | Status: SHIPPED | OUTPATIENT
Start: 2023-04-20 | End: 2023-04-27

## 2023-04-20 NOTE — TELEPHONE ENCOUNTER
Staff, please fax order to [facility] that is in patient chart. Please notify family via my chart when complete.

## 2023-04-20 NOTE — TELEPHONE ENCOUNTER
Patient daughter contacted the office today for the patient to have Keflex called into the pharmacy because she is having some dysuria that started yesterday. Patient's daughter stated that she was treated from the Walk in clinic about two months ago with the same symptoms. Writer advised to have the patient evaluated at a walk in or urgent care in [place]. Or to complete an evisit for symptoms.

## 2023-05-18 ENCOUNTER — OFFICE VISIT (OUTPATIENT)
Dept: FAMILY MEDICINE CLINIC | Age: 88
End: 2023-05-18

## 2023-05-18 VITALS
DIASTOLIC BLOOD PRESSURE: 60 MMHG | HEART RATE: 98 BPM | BODY MASS INDEX: 19.04 KG/M2 | WEIGHT: 97 LBS | OXYGEN SATURATION: 95 % | HEIGHT: 60 IN | SYSTOLIC BLOOD PRESSURE: 140 MMHG

## 2023-05-18 DIAGNOSIS — M54.50 CHRONIC BILATERAL LOW BACK PAIN, UNSPECIFIED WHETHER SCIATICA PRESENT: ICD-10-CM

## 2023-05-18 DIAGNOSIS — J44.9 CHRONIC OBSTRUCTIVE PULMONARY DISEASE, UNSPECIFIED COPD TYPE (HCC): ICD-10-CM

## 2023-05-18 DIAGNOSIS — J41.0 SIMPLE CHRONIC BRONCHITIS (HCC): ICD-10-CM

## 2023-05-18 DIAGNOSIS — I48.91 ATRIAL FIBRILLATION, UNSPECIFIED TYPE (HCC): ICD-10-CM

## 2023-05-18 DIAGNOSIS — G89.29 CHRONIC BILATERAL LOW BACK PAIN, UNSPECIFIED WHETHER SCIATICA PRESENT: ICD-10-CM

## 2023-05-18 DIAGNOSIS — M48.062 SPINAL STENOSIS OF LUMBAR REGION WITH NEUROGENIC CLAUDICATION: Primary | ICD-10-CM

## 2023-05-18 DIAGNOSIS — I50.1 LEFT-SIDED HEART FAILURE (HCC): ICD-10-CM

## 2023-05-18 DIAGNOSIS — D53.9 MACROCYTIC ANEMIA: ICD-10-CM

## 2023-05-18 DIAGNOSIS — I10 PRIMARY HYPERTENSION: ICD-10-CM

## 2023-05-18 DIAGNOSIS — D69.2 SENILE PURPURA (HCC): ICD-10-CM

## 2023-05-18 RX ORDER — BACLOFEN 5 MG/1
5 TABLET ORAL 2 TIMES DAILY
Qty: 180 TABLET | Refills: 1 | Status: SHIPPED | OUTPATIENT
Start: 2023-05-18 | End: 2024-05-17

## 2023-05-18 ASSESSMENT — PATIENT HEALTH QUESTIONNAIRE - PHQ9
1. LITTLE INTEREST OR PLEASURE IN DOING THINGS: 0
SUM OF ALL RESPONSES TO PHQ QUESTIONS 1-9: 0
SUM OF ALL RESPONSES TO PHQ9 QUESTIONS 1 & 2: 0
SUM OF ALL RESPONSES TO PHQ QUESTIONS 1-9: 0
2. FEELING DOWN, DEPRESSED OR HOPELESS: 0

## 2023-05-24 ENCOUNTER — HOSPITAL ENCOUNTER (OUTPATIENT)
Age: 88
Setting detail: SPECIMEN
Discharge: HOME | End: 2023-05-24

## 2023-05-24 DIAGNOSIS — I10 PRIMARY HYPERTENSION: ICD-10-CM

## 2023-05-24 DIAGNOSIS — I48.91 ATRIAL FIBRILLATION, UNSPECIFIED TYPE (HCC): ICD-10-CM

## 2023-05-24 DIAGNOSIS — D53.9 MACROCYTIC ANEMIA: ICD-10-CM

## 2023-05-24 LAB
ALBUMIN SERPL-MCNC: 4.5 G/DL (ref 3.5–5.2)
ALBUMIN/GLOB SERPL: 1.6 {RATIO} (ref 1–2.5)
ALP SERPL-CCNC: 74 U/L (ref 35–104)
ALT SERPL-CCNC: 17 U/L (ref 5–33)
ANION GAP SERPL CALCULATED.3IONS-SCNC: 12 MMOL/L (ref 9–17)
AST SERPL-CCNC: 26 U/L
BASOPHILS # BLD: 0.07 K/UL (ref 0–0.2)
BASOPHILS NFR BLD: 1 % (ref 0–2)
BILIRUB SERPL-MCNC: 0.4 MG/DL (ref 0.3–1.2)
BUN SERPL-MCNC: 25 MG/DL (ref 8–23)
CALCIUM SERPL-MCNC: 9.5 MG/DL (ref 8.6–10.4)
CHLORIDE SERPL-SCNC: 99 MMOL/L (ref 98–107)
CO2 SERPL-SCNC: 25 MMOL/L (ref 20–31)
CREAT SERPL-MCNC: 0.72 MG/DL (ref 0.5–0.9)
EOSINOPHIL # BLD: 0.29 K/UL (ref 0–0.44)
EOSINOPHILS RELATIVE PERCENT: 5 % (ref 1–4)
ERYTHROCYTE [DISTWIDTH] IN BLOOD BY AUTOMATED COUNT: 14.9 % (ref 11.8–14.4)
FOLATE SERPL-MCNC: >20 NG/ML
GFR SERPL CREATININE-BSD FRML MDRD: >60 ML/MIN/1.73M2
GLUCOSE P FAST SERPL-MCNC: 90 MG/DL (ref 70–99)
HCT VFR BLD AUTO: 36.8 % (ref 36.3–47.1)
HGB BLD-MCNC: 12 G/DL (ref 11.9–15.1)
IMM GRANULOCYTES # BLD AUTO: <0.03 K/UL (ref 0–0.3)
IMM GRANULOCYTES NFR BLD: 0 %
IRON SATN MFR SERPL: 32 % (ref 20–55)
IRON SERPL-MCNC: 94 UG/DL (ref 37–145)
LYMPHOCYTES # BLD: 21 % (ref 24–43)
LYMPHOCYTES NFR BLD: 1.3 K/UL (ref 1.1–3.7)
MCH RBC QN AUTO: 34.1 PG (ref 25.2–33.5)
MCHC RBC AUTO-ENTMCNC: 32.6 G/DL (ref 28.4–34.8)
MCV RBC AUTO: 104.5 FL (ref 82.6–102.9)
MONOCYTES NFR BLD: 0.8 K/UL (ref 0.1–1.2)
MONOCYTES NFR BLD: 13 % (ref 3–12)
NEUTROPHILS NFR BLD: 60 % (ref 36–65)
NEUTS SEG NFR BLD: 3.88 K/UL (ref 1.5–8.1)
NRBC AUTOMATED: 0 PER 100 WBC
PLATELET # BLD AUTO: 348 K/UL (ref 138–453)
PMV BLD AUTO: 10.3 FL (ref 8.1–13.5)
POTASSIUM SERPL-SCNC: 5.3 MMOL/L (ref 3.7–5.3)
PROT SERPL-MCNC: 7.3 G/DL (ref 6.4–8.3)
RBC # BLD AUTO: 3.52 M/UL (ref 3.95–5.11)
RBC # BLD: ABNORMAL 10*6/UL
RBC # BLD: ABNORMAL 10*6/UL
SODIUM SERPL-SCNC: 136 MMOL/L (ref 135–144)
TIBC SERPL-MCNC: 295 UG/DL (ref 250–450)
UNSATURATED IRON BINDING CAPACITY: 201 UG/DL (ref 112–347)
VIT B12 SERPL-MCNC: 1527 PG/ML (ref 232–1245)
WBC OTHER # BLD: 6.4 K/UL (ref 3.5–11.3)

## 2023-06-11 ASSESSMENT — ENCOUNTER SYMPTOMS
VOMITING: 0
SORE THROAT: 0
CONSTIPATION: 0
COUGH: 0
SHORTNESS OF BREATH: 0
DIARRHEA: 0
NAUSEA: 0
EYE DISCHARGE: 0
BACK PAIN: 1

## 2023-07-10 NOTE — PROGRESS NOTES
taking gabapentin, but feels that maybe the dose was increased too quickly and this made her very sleepy at that time. Patient's pain is significantly impacting her mobility and day-to-day activities. She does continue to do physical therapy to help a keep her moving. Review of Systems   Constitutional:  Negative for activity change, fatigue and fever. HENT:  Negative for dental problem and sore throat. Eyes:  Negative for discharge and visual disturbance. Respiratory:  Negative for cough and shortness of breath. Cardiovascular:  Negative for chest pain, palpitations and leg swelling. Gastrointestinal:  Negative for constipation, diarrhea, nausea and vomiting. Genitourinary:  Negative for difficulty urinating and dysuria. Musculoskeletal:  Positive for arthralgias (bilateral legs) and back pain (chronic, and worsening). Negative for myalgias. Skin:  Negative for rash and wound. Allergic/Immunologic: Negative for environmental allergies. Neurological:  Negative for headaches. Hematological:  Does not bruise/bleed easily. Psychiatric/Behavioral:  Negative for agitation and sleep disturbance. Objective   Physical Exam  Vitals reviewed. Constitutional:       General: She is not in acute distress. Appearance: Normal appearance. She is not ill-appearing, toxic-appearing or diaphoretic. Cardiovascular:      Rate and Rhythm: Normal rate and regular rhythm. Heart sounds: Normal heart sounds. No murmur heard. Pulmonary:      Effort: Pulmonary effort is normal. No respiratory distress. Breath sounds: Normal breath sounds. Musculoskeletal:         General: Swelling (scant lower extremity edema) present. Comments: Walking with walker   Skin:     General: Skin is warm and dry. Neurological:      Mental Status: She is alert and oriented to person, place, and time. Motor: No weakness.    Psychiatric:         Mood and Affect: Mood normal.         Behavior: vomiting

## 2023-07-28 DIAGNOSIS — J41.0 SIMPLE CHRONIC BRONCHITIS (HCC): ICD-10-CM

## 2023-07-28 DIAGNOSIS — J44.9 CHRONIC OBSTRUCTIVE PULMONARY DISEASE, UNSPECIFIED COPD TYPE (HCC): ICD-10-CM

## 2023-07-28 DIAGNOSIS — R06.09 DYSPNEA ON EXERTION: ICD-10-CM

## 2023-07-28 RX ORDER — FLUTICASONE PROPIONATE AND SALMETEROL 55; 14 UG/1; UG/1
1 POWDER, METERED RESPIRATORY (INHALATION) 2 TIMES DAILY
Qty: 1 EACH | Refills: 6 | Status: SHIPPED | OUTPATIENT
Start: 2023-07-28

## 2023-07-28 NOTE — TELEPHONE ENCOUNTER
LOV 5/18/2023     RTO 11/9/2023  LRF 11/2/2022          Controlled Substance Monitoring:    Acute and Chronic Pain Monitoring:   RX Monitoring 2/1/2023   Periodic Controlled Substance Monitoring Possible medication side effects, risk of tolerance/dependence & alternative treatments discussed.

## 2023-08-16 ENCOUNTER — TELEPHONE (OUTPATIENT)
Dept: PRIMARY CARE CLINIC | Age: 88
End: 2023-08-16

## 2023-08-16 NOTE — TELEPHONE ENCOUNTER
Patient came to the Walk-in to get her right lower leg evaluated. Patient states at PT yesterday a piece of equipment fell on her shin and peeled her skin. Skin tear was approximate size of a half dollar. Writer spoke with patients pcp [employer]. Patient was advised to keep an eye on the skin tear for signs of infection or if the area gets concerning to be evaluated. Patient verbalized understanding.

## 2023-10-26 ENCOUNTER — OFFICE VISIT (OUTPATIENT)
Dept: PRIMARY CARE CLINIC | Age: 88
End: 2023-10-26
Payer: MEDICARE

## 2023-10-26 VITALS
OXYGEN SATURATION: 95 % | BODY MASS INDEX: 18.94 KG/M2 | TEMPERATURE: 97.9 F | HEART RATE: 71 BPM | WEIGHT: 97 LBS | SYSTOLIC BLOOD PRESSURE: 130 MMHG | DIASTOLIC BLOOD PRESSURE: 64 MMHG

## 2023-10-26 DIAGNOSIS — T14.8XXA HEMATOMA: Primary | ICD-10-CM

## 2023-10-26 PROCEDURE — 99213 OFFICE O/P EST LOW 20 MIN: CPT | Performed by: FAMILY MEDICINE

## 2023-10-26 PROCEDURE — 1123F ACP DISCUSS/DSCN MKR DOCD: CPT | Performed by: FAMILY MEDICINE

## 2023-10-26 NOTE — PROGRESS NOTES
provider about this. There are no discontinued medications. THE ABOVE NOTED DISCONTINUED MEDS MAY ONLY BE FROM 'CLEANING UP' THE MED LIST AND WERE NOT ACTUALLY CANCELED;  SEE CHART FOR DETAILS! No orders of the defined types were placed in this encounter. No orders of the defined types were placed in this encounter. Return in about 2 weeks (around 11/9/2023). There are no Patient Instructions on file for this visit.   Follow up with your provider

## 2023-11-09 ENCOUNTER — OFFICE VISIT (OUTPATIENT)
Dept: FAMILY MEDICINE CLINIC | Age: 88
End: 2023-11-09
Payer: MEDICARE

## 2023-11-09 VITALS
SYSTOLIC BLOOD PRESSURE: 122 MMHG | HEIGHT: 60 IN | OXYGEN SATURATION: 97 % | HEART RATE: 78 BPM | BODY MASS INDEX: 20.03 KG/M2 | DIASTOLIC BLOOD PRESSURE: 60 MMHG | WEIGHT: 102 LBS

## 2023-11-09 DIAGNOSIS — Z01.89 ROUTINE LAB DRAW: ICD-10-CM

## 2023-11-09 DIAGNOSIS — J44.9 CHRONIC OBSTRUCTIVE PULMONARY DISEASE, UNSPECIFIED COPD TYPE (HCC): ICD-10-CM

## 2023-11-09 DIAGNOSIS — Z00.00 MEDICARE ANNUAL WELLNESS VISIT, SUBSEQUENT: Primary | ICD-10-CM

## 2023-11-09 DIAGNOSIS — D53.9 MACROCYTIC ANEMIA: ICD-10-CM

## 2023-11-09 DIAGNOSIS — R06.09 DYSPNEA ON EXERTION: ICD-10-CM

## 2023-11-09 DIAGNOSIS — J41.0 SIMPLE CHRONIC BRONCHITIS (HCC): ICD-10-CM

## 2023-11-09 DIAGNOSIS — S69.92XD FINGER INJURY, LEFT, SUBSEQUENT ENCOUNTER: ICD-10-CM

## 2023-11-09 PROCEDURE — 1123F ACP DISCUSS/DSCN MKR DOCD: CPT

## 2023-11-09 PROCEDURE — G0439 PPPS, SUBSEQ VISIT: HCPCS

## 2023-11-09 RX ORDER — MELOXICAM 15 MG/1
15 TABLET ORAL DAILY
Qty: 30 TABLET | Refills: 0 | Status: SHIPPED | OUTPATIENT
Start: 2023-11-09

## 2023-11-09 RX ORDER — FLUTICASONE PROPIONATE AND SALMETEROL 55; 14 UG/1; UG/1
1 POWDER, METERED RESPIRATORY (INHALATION) 2 TIMES DAILY
Qty: 1 EACH | Refills: 6 | Status: SHIPPED | OUTPATIENT
Start: 2023-11-09

## 2023-11-09 ASSESSMENT — PATIENT HEALTH QUESTIONNAIRE - PHQ9
SUM OF ALL RESPONSES TO PHQ QUESTIONS 1-9: 0
SUM OF ALL RESPONSES TO PHQ9 QUESTIONS 1 & 2: 0
SUM OF ALL RESPONSES TO PHQ QUESTIONS 1-9: 0
SUM OF ALL RESPONSES TO PHQ QUESTIONS 1-9: 0
2. FEELING DOWN, DEPRESSED OR HOPELESS: 0
1. LITTLE INTEREST OR PLEASURE IN DOING THINGS: 0
SUM OF ALL RESPONSES TO PHQ QUESTIONS 1-9: 0

## 2023-11-09 ASSESSMENT — LIFESTYLE VARIABLES
HAVE YOU OR SOMEONE ELSE BEEN INJURED AS A RESULT OF YOUR DRINKING: 0
HOW OFTEN DURING THE LAST YEAR HAVE YOU HAD A FEELING OF GUILT OR REMORSE AFTER DRINKING: 0
HOW OFTEN DURING THE LAST YEAR HAVE YOU FAILED TO DO WHAT WAS NORMALLY EXPECTED FROM YOU BECAUSE OF DRINKING: 0
HOW MANY STANDARD DRINKS CONTAINING ALCOHOL DO YOU HAVE ON A TYPICAL DAY: 1 OR 2
HOW OFTEN DURING THE LAST YEAR HAVE YOU NEEDED AN ALCOHOLIC DRINK FIRST THING IN THE MORNING TO GET YOURSELF GOING AFTER A NIGHT OF HEAVY DRINKING: 0
HOW OFTEN DO YOU HAVE A DRINK CONTAINING ALCOHOL: 4 OR MORE TIMES A WEEK
HOW OFTEN DURING THE LAST YEAR HAVE YOU BEEN UNABLE TO REMEMBER WHAT HAPPENED THE NIGHT BEFORE BECAUSE YOU HAD BEEN DRINKING: 0
HOW OFTEN DURING THE LAST YEAR HAVE YOU FOUND THAT YOU WERE NOT ABLE TO STOP DRINKING ONCE YOU HAD STARTED: 0
HAS A RELATIVE, FRIEND, DOCTOR, OR ANOTHER HEALTH PROFESSIONAL EXPRESSED CONCERN ABOUT YOUR DRINKING OR SUGGESTED YOU CUT DOWN: 0

## 2023-11-10 ENCOUNTER — HOSPITAL ENCOUNTER (OUTPATIENT)
Age: 88
Setting detail: SPECIMEN
Discharge: HOME | End: 2023-11-10

## 2023-11-10 DIAGNOSIS — D53.9 MACROCYTIC ANEMIA: ICD-10-CM

## 2023-11-10 DIAGNOSIS — Z01.89 ROUTINE LAB DRAW: ICD-10-CM

## 2023-11-10 LAB
ANION GAP SERPL CALCULATED.3IONS-SCNC: 12 MMOL/L (ref 9–16)
BASOPHILS # BLD: 0.06 K/UL (ref 0–0.2)
BASOPHILS NFR BLD: 1 % (ref 0–2)
BUN SERPL-MCNC: 42 MG/DL (ref 8–23)
CALCIUM SERPL-MCNC: 9.4 MG/DL (ref 8.6–10.4)
CHLORIDE SERPL-SCNC: 105 MMOL/L (ref 98–107)
CO2 SERPL-SCNC: 25 MMOL/L (ref 20–31)
CREAT SERPL-MCNC: 1 MG/DL (ref 0.5–0.9)
EOSINOPHIL # BLD: 0.28 K/UL (ref 0–0.44)
EOSINOPHILS RELATIVE PERCENT: 4 % (ref 1–4)
ERYTHROCYTE [DISTWIDTH] IN BLOOD BY AUTOMATED COUNT: 14.4 % (ref 11.8–14.4)
GFR SERPL CREATININE-BSD FRML MDRD: 54 ML/MIN/1.73M2
GLUCOSE SERPL-MCNC: 101 MG/DL (ref 74–99)
HCT VFR BLD AUTO: 37.8 % (ref 36.3–47.1)
HGB BLD-MCNC: 12 G/DL (ref 11.9–15.1)
IMM GRANULOCYTES # BLD AUTO: 0.04 K/UL (ref 0–0.3)
IMM GRANULOCYTES NFR BLD: 1 %
LYMPHOCYTES NFR BLD: 1.93 K/UL (ref 1.1–3.7)
LYMPHOCYTES RELATIVE PERCENT: 25 % (ref 24–43)
MCH RBC QN AUTO: 33.9 PG (ref 25.2–33.5)
MCHC RBC AUTO-ENTMCNC: 31.7 G/DL (ref 28.4–34.8)
MCV RBC AUTO: 106.8 FL (ref 82.6–102.9)
MONOCYTES NFR BLD: 1.09 K/UL (ref 0.1–1.2)
MONOCYTES NFR BLD: 14 % (ref 3–12)
NEUTROPHILS NFR BLD: 55 % (ref 36–65)
NEUTS SEG NFR BLD: 4.29 K/UL (ref 1.5–8.1)
NRBC BLD-RTO: 0 PER 100 WBC
PLATELET # BLD AUTO: 320 K/UL (ref 138–453)
PMV BLD AUTO: 11.4 FL (ref 8.1–13.5)
POTASSIUM SERPL-SCNC: 5.4 MMOL/L (ref 3.7–5.3)
RBC # BLD AUTO: 3.54 M/UL (ref 3.95–5.11)
RBC # BLD: ABNORMAL 10*6/UL
SODIUM SERPL-SCNC: 142 MMOL/L (ref 136–145)
WBC OTHER # BLD: 7.7 K/UL (ref 3.5–11.3)

## 2023-11-11 LAB
FOLATE SERPL-MCNC: >20 NG/ML
VIT B12 SERPL-MCNC: 1358 PG/ML (ref 232–1245)

## 2023-11-26 DIAGNOSIS — R79.89 ELEVATED SERUM CREATININE: ICD-10-CM

## 2023-11-26 DIAGNOSIS — E87.5 SERUM POTASSIUM ELEVATED: Primary | ICD-10-CM

## 2024-01-04 ENCOUNTER — HOSPITAL ENCOUNTER (OUTPATIENT)
Age: 89
Setting detail: SPECIMEN
Discharge: HOME | End: 2024-01-04
Payer: MEDICARE

## 2024-01-04 DIAGNOSIS — R79.89 ELEVATED SERUM CREATININE: ICD-10-CM

## 2024-01-04 DIAGNOSIS — E87.5 SERUM POTASSIUM ELEVATED: ICD-10-CM

## 2024-01-04 PROCEDURE — 80048 BASIC METABOLIC PNL TOTAL CA: CPT

## 2024-01-04 PROCEDURE — 36415 COLL VENOUS BLD VENIPUNCTURE: CPT

## 2024-01-05 LAB
ANION GAP SERPL CALCULATED.3IONS-SCNC: 15 MMOL/L (ref 9–17)
BUN SERPL-MCNC: 26 MG/DL (ref 8–23)
CALCIUM SERPL-MCNC: 9.3 MG/DL (ref 8.6–10.4)
CHLORIDE SERPL-SCNC: 97 MMOL/L (ref 98–107)
CO2 SERPL-SCNC: 24 MMOL/L (ref 20–31)
CREAT SERPL-MCNC: 0.6 MG/DL (ref 0.5–0.9)
GFR SERPL CREATININE-BSD FRML MDRD: >60 ML/MIN/1.73M2
GLUCOSE SERPL-MCNC: 93 MG/DL (ref 70–99)
POTASSIUM SERPL-SCNC: 5.2 MMOL/L (ref 3.7–5.3)
SODIUM SERPL-SCNC: 136 MMOL/L (ref 135–144)

## 2024-01-30 ENCOUNTER — OFFICE VISIT (OUTPATIENT)
Dept: PRIMARY CARE CLINIC | Age: 89
End: 2024-01-30

## 2024-01-30 VITALS
TEMPERATURE: 97.3 F | SYSTOLIC BLOOD PRESSURE: 132 MMHG | HEART RATE: 92 BPM | OXYGEN SATURATION: 97 % | DIASTOLIC BLOOD PRESSURE: 62 MMHG | WEIGHT: 101 LBS | BODY MASS INDEX: 19.73 KG/M2

## 2024-01-30 DIAGNOSIS — H61.23 BILATERAL IMPACTED CERUMEN: Primary | ICD-10-CM

## 2024-01-30 RX ORDER — MULTIVIT WITH MINERALS/LUTEIN
1000 TABLET ORAL DAILY
COMMUNITY
Start: 2023-01-03

## 2024-01-31 ENCOUNTER — NURSE ONLY (OUTPATIENT)
Dept: PRIMARY CARE CLINIC | Age: 89
End: 2024-01-31

## 2024-01-31 DIAGNOSIS — H61.23 BILATERAL IMPACTED CERUMEN: Primary | ICD-10-CM

## 2024-02-02 ASSESSMENT — ENCOUNTER SYMPTOMS: RESPIRATORY NEGATIVE: 1

## 2024-02-02 NOTE — PROGRESS NOTES
[facility] In  [address] [place] [id number]  Phone: [phone]  Fax: [phone]       [facility] WALK - IN    Pt Name: Misa Comer  MRN: [id number]  Birthdate 2/5/1929  Date of evaluation: 1/30/2024  Provider: Maddi Mckee PA-C     CHIEF COMPLAINT       Chief Complaint   Patient presents with    Cerumen Impaction           HISTORY OF PRESENT ILLNESS  (Location/Symptom, Timing/Onset, Context/Setting, Quality, Duration, Modifying Factors, Severity.)   Misa Comer is a 89 y.o. White (non-) [1] female who presents to the office for evaluation of      Ear Fullness   There is pain in both ears. The current episode started 1 to 4 weeks ago. The problem has been waxing and waning. There has been no fever. The pain is at a severity of 0/10. The patient is experiencing no pain. Associated symptoms include hearing loss. Her past medical history is significant for hearing loss.       Nursing Notes were reviewed.    REVIEW OF SYSTEMS    (2-9 systems for level 4, 10 or more for level 5)     Review of Systems   Constitutional:  Negative for diaphoresis, fatigue and fever.   HENT:  Positive for hearing loss.    Respiratory: Negative.     Cardiovascular: Negative.    Genitourinary: Negative.    Musculoskeletal: Negative.          Except as noted above the remainder of the review of systems was reviewed andnegative.       PAST MEDICAL HISTORY   History reviewed.    Past Medical History:   Diagnosis Date    Anticoagulant long-term use 1/26/2023    A fib diagnosed    Atrial fibrillation (HCC) 02/20/2023    Chronic bilateral low back pain 02/20/2023    Chronic obstructive pulmonary disease, unspecified 05/18/2023    Emphysema of lung (HCC)     Hearing loss     Wears bilateral hearing aids    Neuropathy     Pain in buttocks, legs         SURGICAL HISTORY     History reviewed.    Past Surgical History:   Procedure Laterality Date    EYE SURGERY      Cataracts    FRACTURE SURGERY  11/2021    Right

## 2024-02-02 NOTE — PROGRESS NOTES
Pt came in to have bilateral ears irrigated. Pt was given 5 drops in each ear of debrox. Both ears were irrigated and patient tolerated well. Ears were clear and pink and patient had no complaints of pain or dizziness.

## 2024-05-24 ENCOUNTER — OFFICE VISIT (OUTPATIENT)
Dept: PRIMARY CARE CLINIC | Age: 89
End: 2024-05-24

## 2024-05-24 VITALS
TEMPERATURE: 98.7 F | OXYGEN SATURATION: 94 % | BODY MASS INDEX: 19.83 KG/M2 | DIASTOLIC BLOOD PRESSURE: 58 MMHG | HEIGHT: 60 IN | WEIGHT: 101 LBS | SYSTOLIC BLOOD PRESSURE: 138 MMHG | HEART RATE: 93 BPM

## 2024-05-24 DIAGNOSIS — S81.811A SKIN TEAR OF RIGHT LOWER LEG WITHOUT COMPLICATION, INITIAL ENCOUNTER: Primary | ICD-10-CM

## 2024-05-24 PROCEDURE — 99999 PR OFFICE/OUTPT VISIT,PROCEDURE ONLY: CPT | Performed by: PHYSICIAN ASSISTANT

## 2024-05-24 RX ORDER — DILTIAZEM HYDROCHLORIDE 60 MG/1
60 TABLET, FILM COATED ORAL DAILY
COMMUNITY

## 2024-05-24 RX ORDER — RIVAROXABAN 15 MG/1
15 TABLET, FILM COATED ORAL
COMMUNITY
Start: 2023-01-03

## 2024-05-25 NOTE — PROGRESS NOTES
Due patient being on a blood thinner as well as the patients wound being more than 24 hours old, size of wound, patient was recommended ER for further evaluation an possible wound care   
TB24 Take 1 tablet by mouth daily      mupirocin (BACTROBAN) 2 % ointment Apply topically 3 times daily. 1 g 0    Fluticasone-Salmeterol 55-14 MCG/ACT AEPB Inhale 1 puff into the lungs 2 times daily 1 each 6    meloxicam (MOBIC) 15 MG tablet Take 1 tablet by mouth daily (Patient not taking: Reported on 5/24/2024) 30 tablet 0    latanoprost (XALATAN) 0.005 % ophthalmic solution        No current facility-administered medications for this visit.         ALLERGIES     Seasonal    FAMILY HISTORY           Problem Relation Age of Onset    Diabetes Other      Family Status   Relation Name Status    Other  (Not Specified)          SOCIAL HISTORY      reports that she has quit smoking. Her smoking use included cigarettes. She has never used smokeless tobacco. She reports current alcohol use of about 24.0 - 35.0 standard drinks of alcohol per week. She reports that she does not use drugs.      PHYSICAL EXAM    (up to 7 for level 4, 8 or more for level 5)     Vitals:    05/24/24 1331   BP: (!) 138/58   Pulse: 93   Temp: 98.7 °F (37.1 °C)   SpO2: 94%   Weight: 45.8 kg (101 lb)   Height: 1.524 m (5')         Physical Exam      DIFFERENTIAL DIAGNOSIS:     ***    Stewart reviewed the disposition diagnosis with the patient and or their family/guardian.  I have answered their questions and given discharge instructions.  They voiced understanding of these instructions and did not have anyfurther questions or complaints.      PROCEDURES:  No orders of the defined types were placed in this encounter.      No results found for this visit on 05/24/24.    FINALIMPRESSION            PLAN     No follow-ups on file.      DISCHARGEMEDICATIONS:  No orders of the defined types were placed in this encounter.        Plan:    Patient instructed to return to the office if symptoms worsen, return, or have any other concerns.Patient understands and is agreeable.         Maddi Mckee PA-C 5/24/2024 1:41 PM

## 2024-05-28 ENCOUNTER — OFFICE VISIT (OUTPATIENT)
Dept: FAMILY MEDICINE CLINIC | Age: 89
End: 2024-05-28

## 2024-05-28 VITALS
BODY MASS INDEX: 19.53 KG/M2 | DIASTOLIC BLOOD PRESSURE: 76 MMHG | TEMPERATURE: 97.8 F | SYSTOLIC BLOOD PRESSURE: 120 MMHG | WEIGHT: 100 LBS | HEART RATE: 84 BPM | OXYGEN SATURATION: 98 %

## 2024-05-28 DIAGNOSIS — T14.8XXA DEEP WOUND OF SKIN DUE TO AVULSION: Primary | ICD-10-CM

## 2024-05-28 RX ORDER — CEPHALEXIN 500 MG/1
500 CAPSULE ORAL 4 TIMES DAILY
COMMUNITY
Start: 2024-05-24 | End: 2024-05-31

## 2024-05-28 SDOH — ECONOMIC STABILITY: FOOD INSECURITY: WITHIN THE PAST 12 MONTHS, YOU WORRIED THAT YOUR FOOD WOULD RUN OUT BEFORE YOU GOT MONEY TO BUY MORE.: NEVER TRUE

## 2024-05-28 SDOH — ECONOMIC STABILITY: FOOD INSECURITY: WITHIN THE PAST 12 MONTHS, THE FOOD YOU BOUGHT JUST DIDN'T LAST AND YOU DIDN'T HAVE MONEY TO GET MORE.: NEVER TRUE

## 2024-05-28 SDOH — ECONOMIC STABILITY: INCOME INSECURITY: HOW HARD IS IT FOR YOU TO PAY FOR THE VERY BASICS LIKE FOOD, HOUSING, MEDICAL CARE, AND HEATING?: NOT HARD AT ALL

## 2024-05-28 ASSESSMENT — ENCOUNTER SYMPTOMS: COLOR CHANGE: 1

## 2024-05-28 ASSESSMENT — PATIENT HEALTH QUESTIONNAIRE - PHQ9
SUM OF ALL RESPONSES TO PHQ QUESTIONS 1-9: 0
1. LITTLE INTEREST OR PLEASURE IN DOING THINGS: NOT AT ALL
2. FEELING DOWN, DEPRESSED OR HOPELESS: NOT AT ALL
SUM OF ALL RESPONSES TO PHQ QUESTIONS 1-9: 0
SUM OF ALL RESPONSES TO PHQ QUESTIONS 1-9: 0
SUM OF ALL RESPONSES TO PHQ9 QUESTIONS 1 & 2: 0
SUM OF ALL RESPONSES TO PHQ QUESTIONS 1-9: 0

## 2024-05-28 NOTE — PROGRESS NOTES
Misa Comer (:  [id number]) is a 89 y.o. female,Established patient, here for evaluation of the following chief complaint(s):  Follow-up      Assessment & Plan   1. Deep wound of skin due to avulsion  -     External Referral To Wound Clinic    Some debriedement completed after bed of wound prepped with bedadine of escar tissues in office with sterile straight blade.  ABX ointment applied with clean sterile dressing and coban  Patient tolerated well   Advised to get hydrogel and change dressing daily/ prn   Complete oral ABX   Establish with wound care as she will likely need further wound debridement   S&S given to seek ER    No follow-ups on file.       Subjective   Patient presents today after a skin tear to her right lower leg.  Her walker fell and slid up on her shin, leaving her with a signficant wound.  She was seen originally in our walkin and was advised to go to ER due to her  Xarelto and thickness of wound.  When evaluated in ER, a thrombi pad was placed for hemostasis and she was sent home on oral Keflex 4 times daily.  She has since been doing dry dressing changes and applying Neosporin to the bed of the wound.  On arrival today, avulsion is noted.  Escar is noted to middle of wound where skin was torn.  Mild bleeding from sight.  Some slight surrounding redness.  e        Review of Systems   Skin:  Positive for color change and wound.   All other systems reviewed and are negative.         Objective   Physical Exam  Constitutional:       Appearance: Normal appearance.   Musculoskeletal:         General: Swelling, tenderness and signs of injury present.      Right lower le+ Edema present.      Left lower leg: No edema.        Legs:       Comments: See Media photo   Neurological:      Mental Status: She is alert.                  An electronic signature was used to authenticate this note.    --Francheska Barahona, APRN - CNP

## 2024-05-29 ENCOUNTER — TELEPHONE (OUTPATIENT)
Dept: FAMILY MEDICINE CLINIC | Age: 89
End: 2024-05-29

## 2024-05-29 NOTE — TELEPHONE ENCOUNTER
Received a call from a Nathalie, She stated (Nathalie) that she is looking at the AVS and the person who brought Misa to her appointment with JUSTICE, stated that JUSTICE told her she did not have to get the Solosite/Hydrogel wound cream/gel - available OTC apply twice daily with. Which is what the instructions on the AVS - I looked at the Communication Release of Information that was up dated on 5/28, communicated with Nathalie that She was not on the release and she stated she was.-  I did communicate with her that I was reading The same information she was on the AVS and that is the only instructions I am able to communicate as JUSTICE had written. Did tell her I would communicate with JUDIT RUIZ and address her (Raffaele)  concerns .    Did address the communication release paperwork that was updated on 5/28 with DAKOTA Tse MA.  Paperwork does state son but does have additional name in front of the son    Nathalie would like a call with clarification of WG Instructions  [phone]

## 2024-05-29 NOTE — TELEPHONE ENCOUNTER
Writer was asked by Kaylee SANCHEZ MA to speak with Nathalie regarding Misa's information. Writer did look in media at the [facility] and Nathalie was not listed as someone we can speak to. Writer did advise Nathalie as such and she stated that she would speak to me after that had been corrected.

## 2024-07-10 ENCOUNTER — TELEPHONE (OUTPATIENT)
Dept: FAMILY MEDICINE CLINIC | Age: 89
End: 2024-07-10

## 2024-07-10 NOTE — TELEPHONE ENCOUNTER
Humberto from [employer] stopped by and dropped off  information for pt to get a compression device for her leg. Notes from last appt need to be addend  to cover the cost for device. Form scanned into chart. Notes can be faxed to [facility] [phone]

## 2024-07-10 NOTE — TELEPHONE ENCOUNTER
I have no idea what this is for or who recommended this?  Maybe her PMR doctor or wound care?  I had referred her to wound care at last visit.

## 2024-07-12 ENCOUNTER — PATIENT MESSAGE (OUTPATIENT)
Dept: FAMILY MEDICINE CLINIC | Age: 89
End: 2024-07-12

## 2024-07-12 DIAGNOSIS — J41.0 SIMPLE CHRONIC BRONCHITIS (HCC): Primary | ICD-10-CM

## 2024-07-12 DIAGNOSIS — J44.9 CHRONIC OBSTRUCTIVE PULMONARY DISEASE, UNSPECIFIED COPD TYPE (HCC): ICD-10-CM

## 2024-07-15 NOTE — TELEPHONE ENCOUNTER
From: Misa Bonillae  To: Francheska Barahona  Sent: 7/12/2024 11:59 PM EDT  Subject: Med change?    Hi Francheska,    The inhaler Misa uses, fluticasone-salmeterol 55-14 mcg (Airduo) now costs around $120/month (no discount coupons can be found to reduce the price). She is balking at the cost and asks if she could try a different inhaler? I looked at her formulary and tier 1 includes generic Advair diskus or Wixela. Would either be appropriate for her to try? I’ve attached a screenshot shot of her formulary which lists what is covered and their tiers ( they don’t even list Airduo). If you agree to a trial of a new inhaler, she uses the [place] [employer].     Thanks,  Nathalie (daughter-in-law)

## 2024-07-16 RX ORDER — FLUTICASONE PROPIONATE AND SALMETEROL 250; 50 UG/1; UG/1
1 POWDER RESPIRATORY (INHALATION) EVERY 12 HOURS
Qty: 60 EACH | Refills: 3 | Status: SHIPPED | OUTPATIENT
Start: 2024-07-16

## 2024-09-05 ENCOUNTER — OFFICE VISIT (OUTPATIENT)
Dept: PRIMARY CARE CLINIC | Age: 89
End: 2024-09-05

## 2024-09-05 VITALS
BODY MASS INDEX: 19.14 KG/M2 | HEART RATE: 88 BPM | OXYGEN SATURATION: 95 % | SYSTOLIC BLOOD PRESSURE: 108 MMHG | WEIGHT: 98 LBS | DIASTOLIC BLOOD PRESSURE: 60 MMHG | TEMPERATURE: 97.9 F

## 2024-09-05 DIAGNOSIS — H61.23 CERUMINOSIS, BILATERAL: ICD-10-CM

## 2024-09-05 DIAGNOSIS — H65.192 ACUTE MEE (MIDDLE EAR EFFUSION), LEFT: Primary | ICD-10-CM

## 2024-09-05 RX ORDER — FLUTICASONE PROPIONATE 50 MCG
2 SPRAY, SUSPENSION (ML) NASAL DAILY
Qty: 1 EACH | Refills: 0 | Status: SHIPPED | OUTPATIENT
Start: 2024-09-05 | End: 2024-11-04

## 2024-09-05 NOTE — PROGRESS NOTES
Comments: Small amount of cerumen present in both ear canals.  Instrumentation with lighted curette used to successfully remove the earwax in both ear canals.  Left TM has presence of middle ear effusion.  Eyes:      General:         Right eye: No discharge.         Left eye: No discharge.      Conjunctiva/sclera: Conjunctivae normal.   Cardiovascular:      Rate and Rhythm: Normal rate and regular rhythm.      Heart sounds: Normal heart sounds. No murmur heard.  Pulmonary:      Effort: Pulmonary effort is normal. No respiratory distress.      Breath sounds: Normal breath sounds. No wheezing.   Neurological:      Mental Status: She is alert.   Psychiatric:         Behavior: Behavior normal.         Thought Content: Thought content normal.         Judgment: Judgment normal.         Assessment & Plan:       Diagnosis Orders   1. Acute ABDIEL (middle ear effusion), left  fluticasone (FLONASE) 50 MCG/ACT nasal spray      2. Ceruminosis, bilateral  REMOVE IMPACTED EAR WAX      Cerumen successfully removed bilaterally.  I feel the patient's decreased hearing is related to the middle ear effusion for which I recommended Flonase.  Follow-up here or with PCP if symptoms have not improved in 6-8 weeks.    Call or return to clinic prn if these symptoms worsen or fail to improve as anticipated.  I have reviewed the instructions with the patient, answering all questions to their satisfaction.    Electronically signed by Yvette Marquez MD on 9/5/2024 at 2:34 PM

## 2024-10-21 DIAGNOSIS — J41.0 SIMPLE CHRONIC BRONCHITIS (HCC): ICD-10-CM

## 2024-10-21 DIAGNOSIS — J44.9 CHRONIC OBSTRUCTIVE PULMONARY DISEASE, UNSPECIFIED COPD TYPE (HCC): ICD-10-CM

## 2024-10-21 NOTE — TELEPHONE ENCOUNTER
Spoke with Nathalie and she states patient is having difficulty using her Wexela inhaler and would like to go back on her original inhaler.   She also states the new inhaler gets film on her teeth.    Please advise and route to clinical staff.

## 2024-10-24 ENCOUNTER — PATIENT MESSAGE (OUTPATIENT)
Dept: FAMILY MEDICINE CLINIC | Age: 89
End: 2024-10-24

## 2024-10-24 DIAGNOSIS — J41.0 SIMPLE CHRONIC BRONCHITIS (HCC): ICD-10-CM

## 2024-10-24 DIAGNOSIS — J44.9 CHRONIC OBSTRUCTIVE PULMONARY DISEASE, UNSPECIFIED COPD TYPE (HCC): ICD-10-CM

## 2024-10-24 DIAGNOSIS — R06.09 DYSPNEA ON EXERTION: ICD-10-CM

## 2024-10-24 RX ORDER — FLUTICASONE PROPIONATE AND SALMETEROL 250; 50 UG/1; UG/1
1 POWDER RESPIRATORY (INHALATION) EVERY 12 HOURS
Qty: 60 EACH | Refills: 3 | Status: SHIPPED | OUTPATIENT
Start: 2024-10-24 | End: 2024-10-25

## 2024-10-25 ENCOUNTER — TELEPHONE (OUTPATIENT)
Dept: FAMILY MEDICINE CLINIC | Age: 88
End: 2024-10-25

## 2024-10-25 RX ORDER — FLUTICASONE PROPIONATE AND SALMETEROL 55; 14 UG/1; UG/1
1 POWDER, METERED RESPIRATORY (INHALATION) 2 TIMES DAILY
Qty: 1 EACH | Refills: 3 | Status: SHIPPED | OUTPATIENT
Start: 2024-10-25

## 2024-10-25 RX ORDER — FLUTICASONE PROPIONATE AND SALMETEROL 55; 14 UG/1; UG/1
POWDER, METERED RESPIRATORY (INHALATION)
OUTPATIENT
Start: 2024-10-25

## 2024-10-25 NOTE — TELEPHONE ENCOUNTER
Copied from Pt Message    Ham Pritchard or whoever sees this message,     A prescription was sent to [facility] yesterday for the generic Advair. Misa’s insurance doesn’t cover this. She wants to resume using her original inhaler Fluticasone-salmeterol 55-14. She doesn’t like the Wixela inhaler and her insurance won’t cover Advair, so she asks if #1 fluticasone-salmeterol 55-14 inhaler can be sent to [facility] in [place]?      Thank you,  Nathalie

## 2024-10-25 NOTE — TELEPHONE ENCOUNTER
Mahendra with [facility] is asking if patient has tried 2 other alternatives for the Fluticasone-Salmeterol?  Is this medication medically necessary?    He will be faxing document to us regarding this matter.    Please advise and route to clinical staff.

## 2025-01-30 ENCOUNTER — OFFICE VISIT (OUTPATIENT)
Dept: PRIMARY CARE CLINIC | Age: 89
End: 2025-01-30

## 2025-01-30 VITALS — BODY MASS INDEX: 20.12 KG/M2 | WEIGHT: 103 LBS | HEART RATE: 78 BPM | OXYGEN SATURATION: 94 %

## 2025-01-30 DIAGNOSIS — Z86.69 HISTORY OF IMPACTED EAR WAX: Primary | ICD-10-CM
